# Patient Record
Sex: FEMALE | Race: WHITE
[De-identification: names, ages, dates, MRNs, and addresses within clinical notes are randomized per-mention and may not be internally consistent; named-entity substitution may affect disease eponyms.]

---

## 2021-09-05 ENCOUNTER — HOSPITAL ENCOUNTER (EMERGENCY)
Dept: HOSPITAL 79 - ER1 | Age: 36
Discharge: HOME | End: 2021-09-05
Payer: COMMERCIAL

## 2021-09-05 VITALS — WEIGHT: 275 LBS | HEIGHT: 72 IN | BODY MASS INDEX: 37.25 KG/M2

## 2021-09-05 DIAGNOSIS — I10: ICD-10-CM

## 2021-09-05 DIAGNOSIS — J12.82: ICD-10-CM

## 2021-09-05 DIAGNOSIS — U07.1: ICD-10-CM

## 2021-09-05 DIAGNOSIS — J45.909: ICD-10-CM

## 2021-09-05 DIAGNOSIS — Z23: Primary | ICD-10-CM

## 2021-09-05 DIAGNOSIS — Z88.1: ICD-10-CM

## 2021-09-05 DIAGNOSIS — F17.200: ICD-10-CM

## 2021-09-05 LAB
BUN/CREATININE RATIO: 20 (ref 0–10)
HGB BLD-MCNC: 10.6 GM/DL (ref 12.3–15.3)
RED BLOOD COUNT: 5.56 M/UL (ref 4–5.1)
WHITE BLOOD COUNT: 16.3 K/UL (ref 4.5–11)

## 2021-09-05 PROCEDURE — M0243 CASIRIVI AND IMDEVI INFUSION: HCPCS

## 2021-11-19 ENCOUNTER — HOSPITAL ENCOUNTER (INPATIENT)
Facility: HOSPITAL | Age: 36
LOS: 2 days | Discharge: HOME | End: 2021-11-22
Attending: EMERGENCY MEDICINE | Admitting: INTERNAL MEDICINE

## 2021-11-19 ENCOUNTER — APPOINTMENT (OUTPATIENT)
Dept: GENERAL RADIOLOGY | Facility: HOSPITAL | Age: 36
End: 2021-11-19

## 2021-11-19 DIAGNOSIS — J18.9 PNEUMONIA OF BOTH LUNGS DUE TO INFECTIOUS ORGANISM, UNSPECIFIED PART OF LUNG: ICD-10-CM

## 2021-11-19 DIAGNOSIS — A41.9 SEPSIS, DUE TO UNSPECIFIED ORGANISM, UNSPECIFIED WHETHER ACUTE ORGAN DYSFUNCTION PRESENT (HCC): ICD-10-CM

## 2021-11-19 DIAGNOSIS — J96.01 ACUTE RESPIRATORY FAILURE WITH HYPOXIA (HCC): Primary | ICD-10-CM

## 2021-11-19 LAB
A-A DO2: 54.1 MMHG (ref 0–300)
ALBUMIN SERPL-MCNC: 4.34 G/DL (ref 3.5–5.2)
ALBUMIN/GLOB SERPL: 1.3 G/DL
ALP SERPL-CCNC: 80 U/L (ref 39–117)
ALT SERPL W P-5'-P-CCNC: 17 U/L (ref 1–33)
ANION GAP SERPL CALCULATED.3IONS-SCNC: 14.2 MMOL/L (ref 5–15)
ARTERIAL PATENCY WRIST A: POSITIVE
AST SERPL-CCNC: 30 U/L (ref 1–32)
ATMOSPHERIC PRESS: 735 MMHG
B PARAPERT DNA SPEC QL NAA+PROBE: NOT DETECTED
B PERT DNA SPEC QL NAA+PROBE: NOT DETECTED
B-HCG UR QL: NEGATIVE
BASE EXCESS BLDA CALC-SCNC: 2.6 MMOL/L (ref 0–2)
BDY SITE: ABNORMAL
BILIRUB SERPL-MCNC: 0.3 MG/DL (ref 0–1.2)
BILIRUB UR QL STRIP: NEGATIVE
BODY TEMPERATURE: 0 C
BUN SERPL-MCNC: 15 MG/DL (ref 6–20)
BUN/CREAT SERPL: 23.4 (ref 7–25)
C PNEUM DNA NPH QL NAA+NON-PROBE: NOT DETECTED
CALCIUM SPEC-SCNC: 9.5 MG/DL (ref 8.6–10.5)
CHLORIDE SERPL-SCNC: 102 MMOL/L (ref 98–107)
CLARITY UR: CLEAR
CO2 BLDA-SCNC: 26.6 MMOL/L (ref 22–33)
CO2 SERPL-SCNC: 25.8 MMOL/L (ref 22–29)
COHGB MFR BLD: 1.3 % (ref 0–5)
COLOR UR: YELLOW
CREAT SERPL-MCNC: 0.64 MG/DL (ref 0.57–1)
CRP SERPL-MCNC: 11.8 MG/DL (ref 0–0.5)
D DIMER PPP FEU-MCNC: 1 MCGFEU/ML (ref 0–0.5)
D-LACTATE SERPL-SCNC: 2.4 MMOL/L (ref 0.5–2)
DEPRECATED RDW RBC AUTO: 48.5 FL (ref 37–54)
EOSINOPHIL # BLD MANUAL: 0.33 10*3/MM3 (ref 0–0.4)
EOSINOPHIL NFR BLD MANUAL: 1 % (ref 0.3–6.2)
ERYTHROCYTE [DISTWIDTH] IN BLOOD BY AUTOMATED COUNT: 20 % (ref 12.3–15.4)
FERRITIN SERPL-MCNC: 255.1 NG/ML (ref 13–150)
FLUAV SUBTYP SPEC NAA+PROBE: NOT DETECTED
FLUBV RNA ISLT QL NAA+PROBE: NOT DETECTED
GFR SERPL CREATININE-BSD FRML MDRD: 106 ML/MIN/1.73
GLOBULIN UR ELPH-MCNC: 3.5 GM/DL
GLUCOSE SERPL-MCNC: 98 MG/DL (ref 65–99)
GLUCOSE UR STRIP-MCNC: NEGATIVE MG/DL
HADV DNA SPEC NAA+PROBE: NOT DETECTED
HCO3 BLDA-SCNC: 25.6 MMOL/L (ref 20–26)
HCOV 229E RNA SPEC QL NAA+PROBE: NOT DETECTED
HCOV HKU1 RNA SPEC QL NAA+PROBE: NOT DETECTED
HCOV NL63 RNA SPEC QL NAA+PROBE: NOT DETECTED
HCOV OC43 RNA SPEC QL NAA+PROBE: NOT DETECTED
HCT VFR BLD AUTO: 38.5 % (ref 34–46.6)
HCT VFR BLD CALC: 35.4 % (ref 38–51)
HGB BLD-MCNC: 11.5 G/DL (ref 12–15.9)
HGB BLDA-MCNC: 11.5 G/DL (ref 13.5–17.5)
HGB UR QL STRIP.AUTO: NEGATIVE
HMPV RNA NPH QL NAA+NON-PROBE: NOT DETECTED
HOLD SPECIMEN: NORMAL
HPIV1 RNA ISLT QL NAA+PROBE: NOT DETECTED
HPIV2 RNA SPEC QL NAA+PROBE: NOT DETECTED
HPIV3 RNA NPH QL NAA+PROBE: NOT DETECTED
HPIV4 P GENE NPH QL NAA+PROBE: NOT DETECTED
HYPOCHROMIA BLD QL: ABNORMAL
INHALED O2 CONCENTRATION: 21 %
KETONES UR QL STRIP: NEGATIVE
LDH SERPL-CCNC: 627 U/L (ref 135–214)
LEUKOCYTE ESTERASE UR QL STRIP.AUTO: NEGATIVE
LYMPHOCYTES # BLD MANUAL: 4.93 10*3/MM3 (ref 0.7–3.1)
LYMPHOCYTES NFR BLD MANUAL: 3 % (ref 5–12)
Lab: ABNORMAL
Lab: ABNORMAL
M PNEUMO IGG SER IA-ACNC: NOT DETECTED
MCH RBC QN AUTO: 20.8 PG (ref 26.6–33)
MCHC RBC AUTO-ENTMCNC: 29.9 G/DL (ref 31.5–35.7)
MCV RBC AUTO: 69.6 FL (ref 79–97)
METHGB BLD QL: 0 % (ref 0–3)
MICROCYTES BLD QL: ABNORMAL
MODALITY: ABNORMAL
MONOCYTES # BLD: 0.99 10*3/MM3 (ref 0.1–0.9)
NEUTROPHILS # BLD AUTO: 26.64 10*3/MM3 (ref 1.7–7)
NEUTROPHILS NFR BLD MANUAL: 76 % (ref 42.7–76)
NEUTS BAND NFR BLD MANUAL: 5 % (ref 0–5)
NITRITE UR QL STRIP: NEGATIVE
NOTE: ABNORMAL
NOTIFIED BY: ABNORMAL
NOTIFIED WHO: ABNORMAL
OXYHGB MFR BLDV: 87.8 % (ref 94–99)
PCO2 BLDA: 32.9 MM HG (ref 35–45)
PCO2 TEMP ADJ BLD: ABNORMAL MM[HG]
PH BLDA: 7.5 PH UNITS (ref 7.35–7.45)
PH UR STRIP.AUTO: 7 [PH] (ref 5–8)
PH, TEMP CORRECTED: ABNORMAL
PLATELET # BLD AUTO: 497 10*3/MM3 (ref 140–450)
PMV BLD AUTO: 10.1 FL (ref 6–12)
PO2 BLDA: 53.2 MM HG (ref 83–108)
PO2 TEMP ADJ BLD: ABNORMAL MM[HG]
POTASSIUM SERPL-SCNC: 3.9 MMOL/L (ref 3.5–5.2)
PROT SERPL-MCNC: 7.8 G/DL (ref 6–8.5)
PROT UR QL STRIP: ABNORMAL
RBC # BLD AUTO: 5.53 10*6/MM3 (ref 3.77–5.28)
RHINOVIRUS RNA SPEC NAA+PROBE: NOT DETECTED
RSV RNA NPH QL NAA+NON-PROBE: NOT DETECTED
SAO2 % BLDCOA: 89 % (ref 94–99)
SARS-COV-2 RNA NPH QL NAA+NON-PROBE: NOT DETECTED
SCAN SLIDE: NORMAL
SMALL PLATELETS BLD QL SMEAR: ABNORMAL
SODIUM SERPL-SCNC: 142 MMOL/L (ref 136–145)
SP GR UR STRIP: 1.03 (ref 1–1.03)
TROPONIN T SERPL-MCNC: <0.01 NG/ML (ref 0–0.03)
UROBILINOGEN UR QL STRIP: ABNORMAL
VARIANT LYMPHS NFR BLD MANUAL: 15 % (ref 19.6–45.3)
VENTILATOR MODE: ABNORMAL
WBC NRBC COR # BLD: 32.89 10*3/MM3 (ref 3.4–10.8)
WHOLE BLOOD HOLD SPECIMEN: NORMAL
WHOLE BLOOD HOLD SPECIMEN: NORMAL

## 2021-11-19 PROCEDURE — 94799 UNLISTED PULMONARY SVC/PX: CPT

## 2021-11-19 PROCEDURE — 85379 FIBRIN DEGRADATION QUANT: CPT | Performed by: NURSE PRACTITIONER

## 2021-11-19 PROCEDURE — 87040 BLOOD CULTURE FOR BACTERIA: CPT | Performed by: NURSE PRACTITIONER

## 2021-11-19 PROCEDURE — 84484 ASSAY OF TROPONIN QUANT: CPT | Performed by: NURSE PRACTITIONER

## 2021-11-19 PROCEDURE — 25010000002 CEFTRIAXONE PER 250 MG: Performed by: NURSE PRACTITIONER

## 2021-11-19 PROCEDURE — 82805 BLOOD GASES W/O2 SATURATION: CPT

## 2021-11-19 PROCEDURE — 83050 HGB METHEMOGLOBIN QUAN: CPT

## 2021-11-19 PROCEDURE — 0202U NFCT DS 22 TRGT SARS-COV-2: CPT | Performed by: NURSE PRACTITIONER

## 2021-11-19 PROCEDURE — 82375 ASSAY CARBOXYHB QUANT: CPT

## 2021-11-19 PROCEDURE — 82728 ASSAY OF FERRITIN: CPT | Performed by: NURSE PRACTITIONER

## 2021-11-19 PROCEDURE — 80306 DRUG TEST PRSMV INSTRMNT: CPT | Performed by: INTERNAL MEDICINE

## 2021-11-19 PROCEDURE — 80053 COMPREHEN METABOLIC PANEL: CPT | Performed by: NURSE PRACTITIONER

## 2021-11-19 PROCEDURE — 93005 ELECTROCARDIOGRAM TRACING: CPT | Performed by: NURSE PRACTITIONER

## 2021-11-19 PROCEDURE — 84145 PROCALCITONIN (PCT): CPT | Performed by: NURSE PRACTITIONER

## 2021-11-19 PROCEDURE — 86140 C-REACTIVE PROTEIN: CPT | Performed by: NURSE PRACTITIONER

## 2021-11-19 PROCEDURE — 81025 URINE PREGNANCY TEST: CPT | Performed by: NURSE PRACTITIONER

## 2021-11-19 PROCEDURE — 81003 URINALYSIS AUTO W/O SCOPE: CPT | Performed by: NURSE PRACTITIONER

## 2021-11-19 PROCEDURE — 71045 X-RAY EXAM CHEST 1 VIEW: CPT

## 2021-11-19 PROCEDURE — 87899 AGENT NOS ASSAY W/OPTIC: CPT | Performed by: PHYSICIAN ASSISTANT

## 2021-11-19 PROCEDURE — 83605 ASSAY OF LACTIC ACID: CPT | Performed by: NURSE PRACTITIONER

## 2021-11-19 PROCEDURE — 36600 WITHDRAWAL OF ARTERIAL BLOOD: CPT

## 2021-11-19 PROCEDURE — 85007 BL SMEAR W/DIFF WBC COUNT: CPT | Performed by: NURSE PRACTITIONER

## 2021-11-19 PROCEDURE — 99285 EMERGENCY DEPT VISIT HI MDM: CPT

## 2021-11-19 PROCEDURE — 83615 LACTATE (LD) (LDH) ENZYME: CPT | Performed by: NURSE PRACTITIONER

## 2021-11-19 PROCEDURE — 25010000002 METHYLPREDNISOLONE PER 125 MG: Performed by: NURSE PRACTITIONER

## 2021-11-19 PROCEDURE — 85025 COMPLETE CBC W/AUTO DIFF WBC: CPT | Performed by: NURSE PRACTITIONER

## 2021-11-19 RX ORDER — SODIUM CHLORIDE 0.9 % (FLUSH) 0.9 %
10 SYRINGE (ML) INJECTION AS NEEDED
Status: DISCONTINUED | OUTPATIENT
Start: 2021-11-19 | End: 2021-11-22 | Stop reason: HOSPADM

## 2021-11-19 RX ORDER — METHYLPREDNISOLONE SODIUM SUCCINATE 125 MG/2ML
125 INJECTION, POWDER, LYOPHILIZED, FOR SOLUTION INTRAMUSCULAR; INTRAVENOUS ONCE
Status: COMPLETED | OUTPATIENT
Start: 2021-11-19 | End: 2021-11-19

## 2021-11-19 RX ADMIN — SODIUM CHLORIDE 1000 ML: 9 INJECTION, SOLUTION INTRAVENOUS at 22:46

## 2021-11-19 RX ADMIN — METHYLPREDNISOLONE SODIUM SUCCINATE 125 MG: 125 INJECTION, POWDER, FOR SOLUTION INTRAMUSCULAR; INTRAVENOUS at 23:34

## 2021-11-19 RX ADMIN — CEFTRIAXONE SODIUM 2 G: 2 INJECTION, POWDER, FOR SOLUTION INTRAMUSCULAR; INTRAVENOUS at 23:34

## 2021-11-20 ENCOUNTER — APPOINTMENT (OUTPATIENT)
Dept: ULTRASOUND IMAGING | Facility: HOSPITAL | Age: 36
End: 2021-11-20

## 2021-11-20 ENCOUNTER — APPOINTMENT (OUTPATIENT)
Dept: CARDIOLOGY | Facility: HOSPITAL | Age: 36
End: 2021-11-20

## 2021-11-20 ENCOUNTER — APPOINTMENT (OUTPATIENT)
Dept: CT IMAGING | Facility: HOSPITAL | Age: 36
End: 2021-11-20

## 2021-11-20 PROBLEM — A41.9 SEPSIS DUE TO PNEUMONIA: Status: ACTIVE | Noted: 2021-11-20

## 2021-11-20 PROBLEM — J18.9 SEPSIS DUE TO PNEUMONIA: Status: ACTIVE | Noted: 2021-11-20

## 2021-11-20 LAB
ALBUMIN SERPL-MCNC: 3.81 G/DL (ref 3.5–5.2)
ALBUMIN/GLOB SERPL: 1.2 G/DL
ALP SERPL-CCNC: 80 U/L (ref 39–117)
ALT SERPL W P-5'-P-CCNC: 17 U/L (ref 1–33)
AMPHET+METHAMPHET UR QL: NEGATIVE
AMPHETAMINES UR QL: NEGATIVE
ANION GAP SERPL CALCULATED.3IONS-SCNC: 11.6 MMOL/L (ref 5–15)
ANISOCYTOSIS BLD QL: NORMAL
AST SERPL-CCNC: 31 U/L (ref 1–32)
BARBITURATES UR QL SCN: NEGATIVE
BASOPHILS # BLD AUTO: 0.03 10*3/MM3 (ref 0–0.2)
BASOPHILS NFR BLD AUTO: 0.1 % (ref 0–1.5)
BENZODIAZ UR QL SCN: NEGATIVE
BH CV ECHO MEAS - AO MAX PG: 8.1 MMHG
BH CV ECHO MEAS - AO MEAN PG: 5 MMHG
BH CV ECHO MEAS - AO ROOT AREA (BSA CORRECTED): 1.2
BH CV ECHO MEAS - AO ROOT AREA: 6.2 CM^2
BH CV ECHO MEAS - AO ROOT DIAM: 2.8 CM
BH CV ECHO MEAS - AO V2 MAX: 142 CM/SEC
BH CV ECHO MEAS - AO V2 MEAN: 99.9 CM/SEC
BH CV ECHO MEAS - AO V2 VTI: 30.5 CM
BH CV ECHO MEAS - BSA(HAYCOCK): 2.6 M^2
BH CV ECHO MEAS - BSA: 2.4 M^2
BH CV ECHO MEAS - BZI_BMI: 39.9 KILOGRAMS/M^2
BH CV ECHO MEAS - BZI_METRIC_HEIGHT: 177.8 CM
BH CV ECHO MEAS - BZI_METRIC_WEIGHT: 126.1 KG
BH CV ECHO MEAS - EDV(CUBED): 110.6 ML
BH CV ECHO MEAS - EDV(MOD-SP4): 49.8 ML
BH CV ECHO MEAS - EDV(TEICH): 107.5 ML
BH CV ECHO MEAS - EF(CUBED): 61.2 %
BH CV ECHO MEAS - EF(MOD-SP4): 55.6 %
BH CV ECHO MEAS - EF(TEICH): 52.7 %
BH CV ECHO MEAS - ESV(CUBED): 42.9 ML
BH CV ECHO MEAS - ESV(MOD-SP4): 22.1 ML
BH CV ECHO MEAS - ESV(TEICH): 50.9 ML
BH CV ECHO MEAS - FS: 27.1 %
BH CV ECHO MEAS - IVS/LVPW: 0.67
BH CV ECHO MEAS - IVSD: 1 CM
BH CV ECHO MEAS - LV DIASTOLIC VOL/BSA (35-75): 20.7 ML/M^2
BH CV ECHO MEAS - LV MASS(C)D: 232.3 GRAMS
BH CV ECHO MEAS - LV MASS(C)DI: 96.7 GRAMS/M^2
BH CV ECHO MEAS - LV SYSTOLIC VOL/BSA (12-30): 9.2 ML/M^2
BH CV ECHO MEAS - LVIDD: 4.8 CM
BH CV ECHO MEAS - LVIDS: 3.5 CM
BH CV ECHO MEAS - LVLD AP4: 6.8 CM
BH CV ECHO MEAS - LVLS AP4: 5 CM
BH CV ECHO MEAS - LVPWD: 1.5 CM
BH CV ECHO MEAS - MV A MAX VEL: 84.5 CM/SEC
BH CV ECHO MEAS - MV E MAX VEL: 116 CM/SEC
BH CV ECHO MEAS - MV E/A: 1.4
BH CV ECHO MEAS - PA ACC TIME: 0.09 SEC
BH CV ECHO MEAS - PA PR(ACCEL): 37.6 MMHG
BH CV ECHO MEAS - SI(AO): 78.2 ML/M^2
BH CV ECHO MEAS - SI(CUBED): 28.2 ML/M^2
BH CV ECHO MEAS - SI(MOD-SP4): 11.5 ML/M^2
BH CV ECHO MEAS - SI(TEICH): 23.6 ML/M^2
BH CV ECHO MEAS - SV(AO): 187.8 ML
BH CV ECHO MEAS - SV(CUBED): 67.7 ML
BH CV ECHO MEAS - SV(MOD-SP4): 27.7 ML
BH CV ECHO MEAS - SV(TEICH): 56.7 ML
BILIRUB SERPL-MCNC: 0.3 MG/DL (ref 0–1.2)
BUN SERPL-MCNC: 15 MG/DL (ref 6–20)
BUN/CREAT SERPL: 24.6 (ref 7–25)
BUPRENORPHINE SERPL-MCNC: NEGATIVE NG/ML
CALCIUM SPEC-SCNC: 8.8 MG/DL (ref 8.6–10.5)
CANNABINOIDS SERPL QL: NEGATIVE
CHLORIDE SERPL-SCNC: 104 MMOL/L (ref 98–107)
CO2 SERPL-SCNC: 23.4 MMOL/L (ref 22–29)
COCAINE UR QL: NEGATIVE
CREAT SERPL-MCNC: 0.61 MG/DL (ref 0.57–1)
CRP SERPL-MCNC: 10.78 MG/DL (ref 0–0.5)
D-LACTATE SERPL-SCNC: 1.3 MMOL/L (ref 0.5–2)
D-LACTATE SERPL-SCNC: 1.5 MMOL/L (ref 0.5–2)
D-LACTATE SERPL-SCNC: 1.7 MMOL/L (ref 0.5–2)
D-LACTATE SERPL-SCNC: 1.9 MMOL/L (ref 0.5–2)
DEPRECATED RDW RBC AUTO: 47.8 FL (ref 37–54)
EOSINOPHIL # BLD AUTO: 0 10*3/MM3 (ref 0–0.4)
EOSINOPHIL NFR BLD AUTO: 0 % (ref 0.3–6.2)
ERYTHROCYTE [DISTWIDTH] IN BLOOD BY AUTOMATED COUNT: 19.6 % (ref 12.3–15.4)
FOLATE SERPL-MCNC: 4.31 NG/ML (ref 4.78–24.2)
GFR SERPL CREATININE-BSD FRML MDRD: 112 ML/MIN/1.73
GLOBULIN UR ELPH-MCNC: 3.1 GM/DL
GLUCOSE SERPL-MCNC: 113 MG/DL (ref 65–99)
HBA1C MFR BLD: 5.3 % (ref 4.8–5.6)
HCT VFR BLD AUTO: 34.9 % (ref 34–46.6)
HGB BLD-MCNC: 10.5 G/DL (ref 12–15.9)
HYPOCHROMIA BLD QL: NORMAL
IMM GRANULOCYTES # BLD AUTO: 0.29 10*3/MM3 (ref 0–0.05)
IMM GRANULOCYTES NFR BLD AUTO: 1.1 % (ref 0–0.5)
IRON 24H UR-MRATE: 13 MCG/DL (ref 37–145)
IRON SATN MFR SERPL: 4 % (ref 20–50)
L PNEUMO1 AG UR QL IA: NEGATIVE
LYMPHOCYTES # BLD AUTO: 1.59 10*3/MM3 (ref 0.7–3.1)
LYMPHOCYTES NFR BLD AUTO: 6.2 % (ref 19.6–45.3)
MAXIMAL PREDICTED HEART RATE: 185 BPM
MCH RBC QN AUTO: 20.7 PG (ref 26.6–33)
MCHC RBC AUTO-ENTMCNC: 30.1 G/DL (ref 31.5–35.7)
MCV RBC AUTO: 68.8 FL (ref 79–97)
METHADONE UR QL SCN: POSITIVE
MICROCYTES BLD QL: NORMAL
MONOCYTES # BLD AUTO: 0.61 10*3/MM3 (ref 0.1–0.9)
MONOCYTES NFR BLD AUTO: 2.4 % (ref 5–12)
MRSA DNA SPEC QL NAA+PROBE: NEGATIVE
NEUTROPHILS NFR BLD AUTO: 23.2 10*3/MM3 (ref 1.7–7)
NEUTROPHILS NFR BLD AUTO: 90.2 % (ref 42.7–76)
NRBC BLD AUTO-RTO: 0 /100 WBC (ref 0–0.2)
OPIATES UR QL: NEGATIVE
OXYCODONE UR QL SCN: NEGATIVE
PCP UR QL SCN: NEGATIVE
PLAT MORPH BLD: NORMAL
PLATELET # BLD AUTO: 431 10*3/MM3 (ref 140–450)
PMV BLD AUTO: 10.2 FL (ref 6–12)
POTASSIUM SERPL-SCNC: 4.4 MMOL/L (ref 3.5–5.2)
PROCALCITONIN SERPL-MCNC: 0.04 NG/ML (ref 0–0.25)
PROPOXYPH UR QL: NEGATIVE
PROT SERPL-MCNC: 6.9 G/DL (ref 6–8.5)
RBC # BLD AUTO: 5.07 10*6/MM3 (ref 3.77–5.28)
S AUREUS DNA SPEC QL NAA+PROBE: NEGATIVE
S PNEUM AG SPEC QL LA: NEGATIVE
SODIUM SERPL-SCNC: 139 MMOL/L (ref 136–145)
STOMATOCYTES BLD QL SMEAR: NORMAL
STRESS TARGET HR: 157 BPM
T4 FREE SERPL-MCNC: 1.2 NG/DL (ref 0.93–1.7)
TIBC SERPL-MCNC: 350 MCG/DL (ref 298–536)
TRANSFERRIN SERPL-MCNC: 235 MG/DL (ref 200–360)
TRICYCLICS UR QL SCN: NEGATIVE
TSH SERPL DL<=0.05 MIU/L-ACNC: 0.78 UIU/ML (ref 0.27–4.2)
VIT B12 BLD-MCNC: 499 PG/ML (ref 211–946)
WBC NRBC COR # BLD: 25.72 10*3/MM3 (ref 3.4–10.8)

## 2021-11-20 PROCEDURE — 93970 EXTREMITY STUDY: CPT | Performed by: RADIOLOGY

## 2021-11-20 PROCEDURE — 94799 UNLISTED PULMONARY SVC/PX: CPT

## 2021-11-20 PROCEDURE — 83036 HEMOGLOBIN GLYCOSYLATED A1C: CPT | Performed by: PHYSICIAN ASSISTANT

## 2021-11-20 PROCEDURE — 80050 GENERAL HEALTH PANEL: CPT | Performed by: INTERNAL MEDICINE

## 2021-11-20 PROCEDURE — 84439 ASSAY OF FREE THYROXINE: CPT | Performed by: PHYSICIAN ASSISTANT

## 2021-11-20 PROCEDURE — 87641 MR-STAPH DNA AMP PROBE: CPT | Performed by: INTERNAL MEDICINE

## 2021-11-20 PROCEDURE — 93306 TTE W/DOPPLER COMPLETE: CPT

## 2021-11-20 PROCEDURE — 93970 EXTREMITY STUDY: CPT

## 2021-11-20 PROCEDURE — 87070 CULTURE OTHR SPECIMN AEROBIC: CPT | Performed by: INTERNAL MEDICINE

## 2021-11-20 PROCEDURE — 85060 BLOOD SMEAR INTERPRETATION: CPT | Performed by: INTERNAL MEDICINE

## 2021-11-20 PROCEDURE — 25010000002 AZITHROMYCIN PER 500 MG: Performed by: INTERNAL MEDICINE

## 2021-11-20 PROCEDURE — 25010000002 METHYLPREDNISOLONE PER 40 MG: Performed by: PHYSICIAN ASSISTANT

## 2021-11-20 PROCEDURE — 86140 C-REACTIVE PROTEIN: CPT | Performed by: PHYSICIAN ASSISTANT

## 2021-11-20 PROCEDURE — 82607 VITAMIN B-12: CPT | Performed by: PHYSICIAN ASSISTANT

## 2021-11-20 PROCEDURE — 87205 SMEAR GRAM STAIN: CPT | Performed by: INTERNAL MEDICINE

## 2021-11-20 PROCEDURE — 71275 CT ANGIOGRAPHY CHEST: CPT

## 2021-11-20 PROCEDURE — 82746 ASSAY OF FOLIC ACID SERUM: CPT | Performed by: PHYSICIAN ASSISTANT

## 2021-11-20 PROCEDURE — 83605 ASSAY OF LACTIC ACID: CPT | Performed by: NURSE PRACTITIONER

## 2021-11-20 PROCEDURE — 83605 ASSAY OF LACTIC ACID: CPT | Performed by: INTERNAL MEDICINE

## 2021-11-20 PROCEDURE — 87640 STAPH A DNA AMP PROBE: CPT | Performed by: INTERNAL MEDICINE

## 2021-11-20 PROCEDURE — 83540 ASSAY OF IRON: CPT | Performed by: PHYSICIAN ASSISTANT

## 2021-11-20 PROCEDURE — 25010000002 ENOXAPARIN PER 10 MG: Performed by: INTERNAL MEDICINE

## 2021-11-20 PROCEDURE — 84466 ASSAY OF TRANSFERRIN: CPT | Performed by: PHYSICIAN ASSISTANT

## 2021-11-20 PROCEDURE — 94640 AIRWAY INHALATION TREATMENT: CPT

## 2021-11-20 PROCEDURE — 25010000002 IOPAMIDOL 61 % SOLUTION: Performed by: EMERGENCY MEDICINE

## 2021-11-20 PROCEDURE — 85007 BL SMEAR W/DIFF WBC COUNT: CPT | Performed by: INTERNAL MEDICINE

## 2021-11-20 PROCEDURE — 87899 AGENT NOS ASSAY W/OPTIC: CPT | Performed by: INTERNAL MEDICINE

## 2021-11-20 PROCEDURE — 99223 1ST HOSP IP/OBS HIGH 75: CPT | Performed by: INTERNAL MEDICINE

## 2021-11-20 RX ORDER — CHOLECALCIFEROL (VITAMIN D3) 125 MCG
10 CAPSULE ORAL NIGHTLY PRN
Status: DISCONTINUED | OUTPATIENT
Start: 2021-11-20 | End: 2021-11-22 | Stop reason: HOSPADM

## 2021-11-20 RX ORDER — MELOXICAM 7.5 MG/1
15 TABLET ORAL DAILY
Status: DISCONTINUED | OUTPATIENT
Start: 2021-11-20 | End: 2021-11-22 | Stop reason: HOSPADM

## 2021-11-20 RX ORDER — METHYLPREDNISOLONE SODIUM SUCCINATE 40 MG/ML
40 INJECTION, POWDER, LYOPHILIZED, FOR SOLUTION INTRAMUSCULAR; INTRAVENOUS EVERY 12 HOURS
Status: DISCONTINUED | OUTPATIENT
Start: 2021-11-20 | End: 2021-11-22 | Stop reason: HOSPADM

## 2021-11-20 RX ORDER — PREDNISONE 1 MG/1
5 TABLET ORAL TAKE AS DIRECTED
Status: CANCELLED | OUTPATIENT
Start: 2021-11-20

## 2021-11-20 RX ORDER — ALBUTEROL SULFATE 2.5 MG/3ML
2.5 SOLUTION RESPIRATORY (INHALATION) EVERY 6 HOURS PRN
Status: CANCELLED | OUTPATIENT
Start: 2021-11-20

## 2021-11-20 RX ORDER — LEVOFLOXACIN 500 MG/1
500 TABLET, FILM COATED ORAL DAILY
Status: CANCELLED | OUTPATIENT
Start: 2021-11-20 | End: 2021-11-29

## 2021-11-20 RX ORDER — GUAIFENESIN/DEXTROMETHORPHAN 100-10MG/5
5 SYRUP ORAL EVERY 4 HOURS PRN
Status: DISCONTINUED | OUTPATIENT
Start: 2021-11-20 | End: 2021-11-22 | Stop reason: HOSPADM

## 2021-11-20 RX ORDER — IRON POLYSACCHARIDE COMPLEX 150 MG
150 CAPSULE ORAL DAILY
Status: DISCONTINUED | OUTPATIENT
Start: 2021-11-20 | End: 2021-11-22 | Stop reason: HOSPADM

## 2021-11-20 RX ORDER — IPRATROPIUM BROMIDE AND ALBUTEROL SULFATE 2.5; .5 MG/3ML; MG/3ML
3 SOLUTION RESPIRATORY (INHALATION)
Status: DISCONTINUED | OUTPATIENT
Start: 2021-11-20 | End: 2021-11-22 | Stop reason: HOSPADM

## 2021-11-20 RX ORDER — HYDROCHLOROTHIAZIDE 25 MG/1
25 TABLET ORAL DAILY
Status: DISCONTINUED | OUTPATIENT
Start: 2021-11-20 | End: 2021-11-22 | Stop reason: HOSPADM

## 2021-11-20 RX ORDER — BENZONATATE 100 MG/1
100 CAPSULE ORAL 3 TIMES DAILY PRN
Status: DISCONTINUED | OUTPATIENT
Start: 2021-11-20 | End: 2021-11-22 | Stop reason: HOSPADM

## 2021-11-20 RX ORDER — LEVOFLOXACIN 500 MG/1
500 TABLET, FILM COATED ORAL DAILY
COMMUNITY
End: 2021-11-22 | Stop reason: HOSPADM

## 2021-11-20 RX ORDER — SODIUM CHLORIDE 0.9 % (FLUSH) 0.9 %
10 SYRINGE (ML) INJECTION AS NEEDED
Status: DISCONTINUED | OUTPATIENT
Start: 2021-11-20 | End: 2021-11-22 | Stop reason: HOSPADM

## 2021-11-20 RX ORDER — L.ACID,PARA/B.BIFIDUM/S.THERM 8B CELL
1 CAPSULE ORAL DAILY
Status: DISCONTINUED | OUTPATIENT
Start: 2021-11-20 | End: 2021-11-22 | Stop reason: HOSPADM

## 2021-11-20 RX ORDER — ALBUTEROL SULFATE 1.25 MG/3ML
1 SOLUTION RESPIRATORY (INHALATION) EVERY 6 HOURS PRN
Status: CANCELLED | OUTPATIENT
Start: 2021-11-20

## 2021-11-20 RX ORDER — LIDOCAINE 50 MG/G
2 PATCH TOPICAL
Status: DISCONTINUED | OUTPATIENT
Start: 2021-11-20 | End: 2021-11-22 | Stop reason: HOSPADM

## 2021-11-20 RX ORDER — HYDROCHLOROTHIAZIDE 25 MG/1
25 TABLET ORAL DAILY
Status: CANCELLED | OUTPATIENT
Start: 2021-11-20

## 2021-11-20 RX ORDER — MELOXICAM 15 MG/1
15 TABLET ORAL DAILY
COMMUNITY
End: 2022-01-15

## 2021-11-20 RX ORDER — METHADONE HYDROCHLORIDE 10 MG/1
65 TABLET ORAL DAILY
COMMUNITY

## 2021-11-20 RX ORDER — ALBUTEROL SULFATE 90 UG/1
2 AEROSOL, METERED RESPIRATORY (INHALATION) EVERY 6 HOURS PRN
COMMUNITY

## 2021-11-20 RX ORDER — PREDNISONE 5 MG/1
1 TABLET ORAL TAKE AS DIRECTED
COMMUNITY
End: 2021-11-22 | Stop reason: HOSPADM

## 2021-11-20 RX ORDER — SODIUM CHLORIDE 9 MG/ML
100 INJECTION, SOLUTION INTRAVENOUS CONTINUOUS
Status: DISCONTINUED | OUTPATIENT
Start: 2021-11-20 | End: 2021-11-20

## 2021-11-20 RX ORDER — MELOXICAM 7.5 MG/1
15 TABLET ORAL DAILY
Status: CANCELLED | OUTPATIENT
Start: 2021-11-20

## 2021-11-20 RX ORDER — NITROGLYCERIN 0.4 MG/1
0.4 TABLET SUBLINGUAL
Status: DISCONTINUED | OUTPATIENT
Start: 2021-11-20 | End: 2021-11-22 | Stop reason: HOSPADM

## 2021-11-20 RX ORDER — SODIUM CHLORIDE 0.9 % (FLUSH) 0.9 %
10 SYRINGE (ML) INJECTION EVERY 12 HOURS SCHEDULED
Status: DISCONTINUED | OUTPATIENT
Start: 2021-11-20 | End: 2021-11-22 | Stop reason: HOSPADM

## 2021-11-20 RX ORDER — HYDROCHLOROTHIAZIDE 25 MG/1
25 TABLET ORAL DAILY
COMMUNITY
End: 2022-01-15

## 2021-11-20 RX ORDER — ONDANSETRON 4 MG/1
4 TABLET, ORALLY DISINTEGRATING ORAL EVERY 6 HOURS PRN
Status: DISCONTINUED | OUTPATIENT
Start: 2021-11-20 | End: 2021-11-22 | Stop reason: HOSPADM

## 2021-11-20 RX ORDER — ALBUTEROL SULFATE 1.25 MG/3ML
1 SOLUTION RESPIRATORY (INHALATION) EVERY 6 HOURS PRN
COMMUNITY
End: 2022-01-19 | Stop reason: HOSPADM

## 2021-11-20 RX ORDER — PANTOPRAZOLE SODIUM 40 MG/1
40 TABLET, DELAYED RELEASE ORAL
Status: DISCONTINUED | OUTPATIENT
Start: 2021-11-20 | End: 2021-11-22 | Stop reason: HOSPADM

## 2021-11-20 RX ORDER — NICOTINE 21 MG/24HR
1 PATCH, TRANSDERMAL 24 HOURS TRANSDERMAL EVERY 24 HOURS
Status: DISCONTINUED | OUTPATIENT
Start: 2021-11-20 | End: 2021-11-22 | Stop reason: HOSPADM

## 2021-11-20 RX ORDER — HYDROXYZINE HYDROCHLORIDE 25 MG/1
25 TABLET, FILM COATED ORAL 3 TIMES DAILY PRN
Status: DISCONTINUED | OUTPATIENT
Start: 2021-11-20 | End: 2021-11-22 | Stop reason: HOSPADM

## 2021-11-20 RX ORDER — ALBUTEROL SULFATE 2.5 MG/3ML
2.5 SOLUTION RESPIRATORY (INHALATION) EVERY 6 HOURS PRN
Status: ON HOLD | COMMUNITY
End: 2021-11-20

## 2021-11-20 RX ORDER — ACETAMINOPHEN 325 MG/1
650 TABLET ORAL EVERY 6 HOURS PRN
Status: DISCONTINUED | OUTPATIENT
Start: 2021-11-20 | End: 2021-11-22 | Stop reason: HOSPADM

## 2021-11-20 RX ORDER — CHOLECALCIFEROL (VITAMIN D3) 125 MCG
10 CAPSULE ORAL NIGHTLY
Status: DISCONTINUED | OUTPATIENT
Start: 2021-11-20 | End: 2021-11-22 | Stop reason: HOSPADM

## 2021-11-20 RX ADMIN — METHYLPREDNISOLONE SODIUM SUCCINATE 40 MG: 40 INJECTION, POWDER, FOR SOLUTION INTRAMUSCULAR; INTRAVENOUS at 20:58

## 2021-11-20 RX ADMIN — IOPAMIDOL 75 ML: 612 INJECTION, SOLUTION INTRAVENOUS at 00:38

## 2021-11-20 RX ADMIN — ENOXAPARIN SODIUM 40 MG: 40 INJECTION SUBCUTANEOUS at 08:04

## 2021-11-20 RX ADMIN — SODIUM CHLORIDE, PRESERVATIVE FREE 10 ML: 5 INJECTION INTRAVENOUS at 20:57

## 2021-11-20 RX ADMIN — NICOTINE TRANSDERMAL SYSTEM 1 PATCH: 21 PATCH, EXTENDED RELEASE TRANSDERMAL at 08:04

## 2021-11-20 RX ADMIN — DOXYCYCLINE 100 MG: 100 INJECTION, POWDER, LYOPHILIZED, FOR SOLUTION INTRAVENOUS at 00:55

## 2021-11-20 RX ADMIN — MELOXICAM 15 MG: 7.5 TABLET ORAL at 17:01

## 2021-11-20 RX ADMIN — HYDROCHLOROTHIAZIDE 25 MG: 25 TABLET ORAL at 17:01

## 2021-11-20 RX ADMIN — IPRATROPIUM BROMIDE AND ALBUTEROL SULFATE 3 ML: .5; 3 SOLUTION RESPIRATORY (INHALATION) at 19:49

## 2021-11-20 RX ADMIN — SODIUM CHLORIDE 100 ML/HR: 9 INJECTION, SOLUTION INTRAVENOUS at 02:20

## 2021-11-20 RX ADMIN — IPRATROPIUM BROMIDE 0.5 MG: 0.5 SOLUTION RESPIRATORY (INHALATION) at 13:39

## 2021-11-20 RX ADMIN — Medication 1 CAPSULE: at 08:04

## 2021-11-20 RX ADMIN — METHADONE HYDROCHLORIDE 65 MG: 10 TABLET ORAL at 10:27

## 2021-11-20 RX ADMIN — PANTOPRAZOLE SODIUM 40 MG: 40 TABLET, DELAYED RELEASE ORAL at 05:14

## 2021-11-20 RX ADMIN — METHYLPREDNISOLONE SODIUM SUCCINATE 40 MG: 40 INJECTION, POWDER, FOR SOLUTION INTRAMUSCULAR; INTRAVENOUS at 08:04

## 2021-11-20 RX ADMIN — LIDOCAINE 2 PATCH: 50 PATCH CUTANEOUS at 08:04

## 2021-11-20 RX ADMIN — SODIUM CHLORIDE 100 ML/HR: 9 INJECTION, SOLUTION INTRAVENOUS at 13:31

## 2021-11-20 RX ADMIN — Medication 150 MG: at 10:27

## 2021-11-20 RX ADMIN — Medication 10 MG: at 20:57

## 2021-11-20 RX ADMIN — IPRATROPIUM BROMIDE 0.5 MG: 0.5 SOLUTION RESPIRATORY (INHALATION) at 07:16

## 2021-11-20 RX ADMIN — AZITHROMYCIN MONOHYDRATE 500 MG: 500 INJECTION, POWDER, LYOPHILIZED, FOR SOLUTION INTRAVENOUS at 04:01

## 2021-11-20 NOTE — PROGRESS NOTES
"    [facility] HOSPITALIST PROGRESS NOTE     Patient Identification:  Name:  Jessica A Collett  Age:  36 y.o.  Sex:  female  :  1985  MRN:  [id number]  Visit Number:  [id number]  ROOM: [address] [place] [address]     Primary Care Provider:  Lydia Bhagat APRN    Length of stay in inpatient status:  0    Subjective     Chief Compliant:    Chief Complaint   Patient presents with   • Shortness of Breath       History of Presenting Illness: Patient seen and evaluated in follow-up for severe sepsis secondary to community-acquired pneumonia with possible/likely exacerbation of underlying asthma with acute hypoxemic respiratory failure.  At time of examination this morning she remains on 2 L nasal cannula and is breathing comfortably.  Although she does note that she still feels short of breath when getting up out of bed and performing any exertion.  She is otherwise without complaint other than stating that she did not get any sleep last night.    Objective     Current Hospital Meds:[START ON 2021] cefTRIAXone, 2 g, Intravenous, Q24H   And  azithromycin, 500 mg, Intravenous, Q24H  enoxaparin, 40 mg, Subcutaneous, Q24H  ipratropium, 0.5 mg, Nebulization, 4x Daily - RT  iron polysaccharides, 150 mg, Oral, Daily  lactobacillus acidophilus, 1 capsule, Oral, Daily  lidocaine, 2 patch, Transdermal, Q24H  melatonin, 10 mg, Oral, Nightly  methadone, 65 mg, Oral, Daily  methylPREDNISolone sodium succinate, 40 mg, Intravenous, Q12H  nicotine, 1 patch, Transdermal, Q24H  pantoprazole, 40 mg, Oral, Q AM  sodium chloride, 10 mL, Intravenous, Q12H    sodium chloride, 100 mL/hr, Last Rate: 100 mL/hr (21 1331)        Current Antimicrobial Therapy:  Anti-Infectives (From admission, onward)    Ordered     Dose/Rate Route Frequency Start Stop    21 0204  cefTRIAXone (ROCEPHIN) 2 g in sodium chloride 0.9 % 100 mL IVPB-VTB        Ordering Provider: eRnu Fischer, DO   \"And\" Linked Group Details    2 g  200 mL/hr over " "30 Minutes Intravenous Every 24 Hours 11/21/21 0000 11/27/21 2359    11/20/21 0204  azithromycin 500 MG/250 ML 0.9% NS IVPB (MBP)        Ordering Provider: Renu Fischer,    \"And\" Linked Group Details    500 mg  over 60 Minutes Intravenous Every 24 Hours 11/20/21 0204 11/25/21 0259    11/19/21 2325  cefTRIAXone (ROCEPHIN) 2 g in sodium chloride 0.9 % 100 mL IVPB-VTB        Ordering Provider: Jovanna Cárdenas APRN    2 g  200 mL/hr over 30 Minutes Intravenous Once 11/19/21 2327 11/20/21 0054 11/19/21 2325  doxycycline (VIBRAMYCIN) 100 mg in sodium chloride 0.9 % 100 mL IVPB-VTB        Ordering Provider: Jovanna Cárdenas APRN    100 mg Intravenous Once 11/19/21 2327 11/20/21 0055        Current Diuretic Therapy:  Diuretics (From admission, onward)    None        ----------------------------------------------------------------------------------------------------------------------  Vital Signs:  Temp:  [97.6 °F (36.4 °C)-98.4 °F (36.9 °C)] 97.9 °F (36.6 °C)  Heart Rate:  [71-96] 77  Resp:  [18-20] 18  BP: (108-155)/(51-90) 122/59  SpO2:  [90 %-97 %] 97 %  on  Flow (L/min):  [2] 2;   Device (Oxygen Therapy): nasal cannula  Body mass index is 39.97 kg/m².    Wt Readings from Last 3 Encounters:   11/20/21 126 kg (278 lb 9.6 oz)     Intake & Output (last 3 days)       11/17 0701 11/18 0700 11/18 0701 11/19 0700 11/19 0701  11/20 0700 11/20 0701  11/21 0700    P.O.   240 570    Total Intake(mL/kg)   240 (1.9) 570 (4.5)    Urine (mL/kg/hr)   500     Total Output   500     Net   -260 +570                Diet Regular  ----------------------------------------------------------------------------------------------------------------------  Physical exam:  Constitutional:  Well-developed and well-nourished.  No acute distress.  Resting comfortably in bed at time of exam.   HENT:  Head:  Normocephalic and atraumatic.  Mouth:  Moist mucous membranes.    Eyes:  Conjunctivae and EOM are normal. No scleral icterus.  "   Cardiovascular:  Normal rate, regular rhythm and normal heart sounds with no murmur.  Pulmonary/Chest:  No respiratory distress, diffuse wheezing throughout with occasional scattered rhonchi., no crackles, with normal breath sounds and good air movement.  Abdominal: Obese, soft.  Bowel sounds are normal.  No distension and no tenderness.   Musculoskeletal:  No edema, no tenderness, and no deformity.  No red or swollen joints anywhere.   Neurological:  Alert and oriented to person, place, and time.  No cranial nerve deficit.    Skin:  Skin is warm and dry. No rash or lesion noted. No pallor.   Peripheral vascular:  Pulses in all 4 extremities with no clubbing, no cyanosis, no edema.  Psychiatric: Appropriate mood and affect, pleasant.   ----------------------------------------------------------------------------------------------------------------------  Tele:    ----------------------------------------------------------------------------------------------------------------------  Results from last 7 days   Lab Units 11/20/21  1240 11/20/21  0356 11/20/21  0135 11/19/21 2241 11/19/21 2241   CRP mg/dL  --  10.78*  --   --  11.80*   LACTATE mmol/L 1.9 1.5 1.3   < > 2.4*   WBC 10*3/mm3  --  25.72*  --   --  32.89*   HEMOGLOBIN g/dL  --  10.5*  --   --  11.5*   HEMATOCRIT %  --  34.9  --   --  38.5   MCV fL  --  68.8*  --   --  69.6*   MCHC g/dL  --  30.1*  --   --  29.9*   PLATELETS 10*3/mm3  --  431  --   --  497*    < > = values in this interval not displayed.     Results from last 7 days   Lab Units 11/19/21  2244   PH, ARTERIAL pH units 7.498*   PO2 ART mm Hg 53.2*   PCO2, ARTERIAL mm Hg 32.9*   HCO3 ART mmol/L 25.6     Results from last 7 days   Lab Units 11/20/21  0356 11/19/21  2241   SODIUM mmol/L 139 142   POTASSIUM mmol/L 4.4 3.9   CHLORIDE mmol/L 104 102   CO2 mmol/L 23.4 25.8   BUN mg/dL 15 15   CREATININE mg/dL 0.61 0.64   EGFR IF NONAFRICN AM mL/min/1.73 112 106   CALCIUM mg/dL 8.8 9.5   GLUCOSE mg/dL  113* 98   ALBUMIN g/dL 3.81 4.34   BILIRUBIN mg/dL 0.3 0.3   ALK PHOS U/L 80 80   AST (SGOT) U/L 31 30   ALT (SGPT) U/L 17 17   Estimated Creatinine Clearance: 185.9 mL/min (by C-G formula based on SCr of 0.61 mg/dL).  No results found for: AMMONIA  Results from last 7 days   Lab Units 11/19/21 2241   TROPONIN T ng/mL <0.010             Hemoglobin A1C   Date/Time Value Ref Range Status   11/20/2021 0356 5.30 4.80 - 5.60 % Final     Lab Results   Component Value Date    TSH 0.778 11/20/2021    FREET4 1.20 11/20/2021     Lab Results   Component Value Date    PREGTESTUR Negative 11/19/2021     Pain Management Panel     Pain Management Panel Latest Ref Rng & Units 11/19/2021    AMPHETAMINES SCREEN, URINE Negative Negative    BARBITURATES SCREEN Negative Negative    BENZODIAZEPINE SCREEN, URINE Negative Negative    BUPRENORPHINEUR Negative Negative    COCAINE SCREEN, URINE Negative Negative    METHADONE SCREEN, URINE Negative Positive(A)    METHAMPHETAMINEUR Negative Negative        Brief Urine Lab Results  (Last result in the past 365 days)      Color   Clarity   Blood   Leuk Est   Nitrite   Protein   CREAT   Urine HCG        11/19/21 2241               Negative       11/19/21 2241 Yellow   Clear   Negative   Negative   Negative   Trace               No results found for: BLOODCX  No results found for: URINECX  No results found for: WOUNDCX  No results found for: STOOLCX  No results found for: RESPCX  No results found for: AFBCX  Results from last 7 days   Lab Units 11/20/21  1240 11/20/21  0356 11/20/21  0135 11/19/21 2241   PROCALCITONIN ng/mL  --   --   --  0.04   LACTATE mmol/L 1.9 1.5 1.3 2.4*   CRP mg/dL  --  10.78*  --  11.80*       I have personally looked at the labs and they are summarized above.  ----------------------------------------------------------------------------------------------------------------------  Detailed radiology reports for the last 24 hours:    Imaging Results (Last 24 Hours)      Procedure Component Value Units Date/Time    US Venous Doppler Lower Extremity Bilateral (duplex) [[id number]] Collected: 11/20/21 1130     Updated: 11/20/21 1133    Narrative:      EXAMINATION: US VENOUS DOPPLER LOWER EXTREMITY BILATERAL (DUPLEX)-      CLINICAL INDICATION:  elevated d-dimer; J96.01-Acute respiratory failure  with hypoxia; A41.9-Sepsis, unspecified organism; J18.9-Pneumonia,  unspecified organism     TECHNIQUE: Multiplanar gray scale and Doppler vascular sonographic  imaging of the deep veins of BILATERAL lower extremity, without and with  compression.      COMPARISON: NONE      FINDINGS:   Deep Veins: The visualized deep veins demonstrate flow and are  compressible. No evidence of deep venous thrombosis.   Superficial veins: Unremarkable. Saphenofemoral junction is patent  without thrombus.  Soft tissues: Within normal limits.       Impression:      No DVT.     This report was finalized on 11/20/2021 11:31 AM by Dr. Amrit Lyon MD.       CT Chest Pulmonary Embolism [[id number]] Collected: 11/20/21 0057     Updated: 11/20/21 0059    Narrative:      CT CHEST PULMONARY EMBOLISM W CONTRAST    INDICATION:   Worsening shortness of breath tonight.    TECHNIQUE:   CT angiogram of the chest with IV contrast. 3-D reconstructions were obtained and reviewed.   Radiation dose reduction techniques included automated exposure control or exposure modulation based on body size. Count of known CT and cardiac nuc med studies  performed in previous 12 months: 0.     COMPARISON:   None available.    FINDINGS:   Adequate opacification of the pulmonary arteries with no filling defects. Thoracic aorta normal in course and caliber without dissection. Heart size is normal. No pericardial effusion.    No pleural effusion. No pneumothorax. Patchy groundglass infiltrates are scattered throughout both lungs.    Visualized upper abdomen is unremarkable.    No acute osseous abnormality.      Impression:      1. Negative for  pulmonary embolus.  2. Negative for thoracic aortic aneurysm/dissection.  3. Patchy groundglass infiltrates are scattered throughout both lungs, consistent with multifocal pneumonia. COVID-19 reporting criteria: Typical. Commonly reported imaging features of COVID-19 pneumonia are present. Other processes such as influenza  pneumonia and organizing pneumonia can cause a similar imaging pattern.    Signer Name: Robinson Medina MD   Signed: 11/20/2021 12:57 AM   Workstation Name: [id number]    [facility]    XR Chest 1 View [[id number]] Collected: 11/19/21 2250     Updated: 11/19/21 2252    Narrative:      CR Chest 1 Vw    INDICATION:   Shortness of breath     COMPARISON:    None available.    FINDINGS:  Portable AP view(s) of the chest.  The heart and mediastinal contours are normal. There are bilateral pulmonary opacities consistent with pneumonia. No pneumothorax or pleural effusion. Plate fixation is identified involving the left clavicle.      Impression:      There is bilateral pneumonia.    Signer Name: Arlette Daivs MD   Signed: 11/19/2021 10:50 PM   Workstation Name: [id number]    [facility]        Assessment & Plan      Severe sepsis  Community-acquired pneumonia  Acute exacerbation of asthma  Acute hypoxemic respiratory failure    -Patient with diffuse multifocal bilateral pneumonia on imaging.  Initial ABG with a PO2 of 53 and an O2 saturation of 89% on room air.    -Continue Rocephin and azithromycin for empiric treatment of community-acquired pneumonia.  Of note patient's QT C is within normal limits however will obtain repeat EKG tomorrow a.m. as patient is also on methadone.    -MRSA nasal swab negative, strep pneumo antigen negative, respiratory panel also negative.  Sputum culture pending collection.    -Given concern for acute exacerbation of asthma we will continue with steroids with Solu-Medrol 40 mg every 12    -Incentive spirometry    -Scheduled  breathing treatments    Elevated D-dimer  Intermittent swelling of left lower extremity    -CT with PE protocol negative and duplex of lower extremity pending at this time..    -TTE ordered and pending at this time    Microcytic anemia  Thrombocytosis    -Likely representative of her acute infectious process at this time    -B12, folate, iron panel all ordered and pending    -Peripheral blood smear also ordered and pending at this time    Street of substance abuse  Chronic methadone    -Patient prescribed methadone dosage confirmed and continued    Smoking tobacco usage    -Nicotine replacement therapy, cessation counseled    Chronic back pain  Arthritis    -Supportive care    Morbid obesity    -Complicates all aspects of care    VTE Prophylaxis:   Mechanical Order History:     None      Pharmalogical Order History:      Ordered     Dose Route Frequency Stop    11/20/21 0204  enoxaparin (LOVENOX) syringe 40 mg         40 mg SC Every 24 Hours --                Disposition Home once medically stable and improved.    Eleno Oconnor DO  [facility]  11/20/21  14:31 EST

## 2021-11-20 NOTE — ED PROVIDER NOTES
Subjective   Patient is a 36-year-old female with past medical history significant for substance abuse.  She reports that she was at her primary care office recently and diagnosed with pneumonia and influenza B.  She states that she also had Covid in August 2021.  Patient reports that she has had progressively worsening shortness of breath.  She states that her PCP prescribed her Levaquin p.o., steroids, and gave her a shot of Rocephin in the office today.  Patient denies any fever, chest pain, nausea, vomiting, diarrhea, abdominal pain, syncope/near syncope, dizziness, headache, lower extremity edema, numbness/tingling or any other significant complaints.       Shortness of Breath  Severity:  Severe  Onset quality:  Sudden  Timing:  Constant  Progression:  Worsening  Context: activity    Relieved by:  Nothing  Worsened by:  Exertion, movement, deep breathing, activity and coughing  Associated symptoms: cough    Associated symptoms: no abdominal pain, no chest pain, no diaphoresis, no fever, no headaches, no syncope and no vomiting    Risk factors: obesity        Review of Systems   Constitutional: Negative.  Negative for diaphoresis and fever.   HENT: Positive for congestion.    Eyes: Negative.    Respiratory: Positive for cough and shortness of breath. Negative for chest tightness.    Cardiovascular: Negative.  Negative for chest pain and syncope.   Gastrointestinal: Negative.  Negative for abdominal pain and vomiting.   Endocrine: Negative.    Genitourinary: Negative.  Negative for dysuria.   Musculoskeletal: Negative.    Skin: Negative.    Allergic/Immunologic: Negative.    Neurological: Negative.  Negative for headaches.   Hematological: Negative.    Psychiatric/Behavioral: Negative.    All other systems reviewed and are negative.      Past Medical History:   Diagnosis Date   • Arthritis    • Asthma    • History of substance abuse (HCC)    • Tobacco use        Allergies   Allergen Reactions   • Bactrim  [Sulfamethoxazole-Trimethoprim] Hives       Past Surgical History:   Procedure Laterality Date   • ABDOMINAL SURGERY     • ANKLE SURGERY     •  SECTION      x3   • CLAVICLE SURGERY      metal plate in place   • TONSILLECTOMY         History reviewed. No pertinent family history.    Social History     Socioeconomic History   • Marital status: Single   Tobacco Use   • Smoking status: Current Every Day Smoker     Packs/day: 1.00     Years: 15.00     Pack years: 15.00   • Smokeless tobacco: Never Used   Substance and Sexual Activity   • Alcohol use: Never   • Drug use: Not Currently   • Sexual activity: Defer           Objective   Physical Exam  Vitals and nursing note reviewed.   Constitutional:       General: She is not in acute distress.     Appearance: She is well-developed. She is obese. She is ill-appearing and toxic-appearing. She is not diaphoretic.   HENT:      Head: Normocephalic and atraumatic.      Right Ear: External ear normal.      Left Ear: External ear normal.      Nose: Nose normal.      Mouth/Throat:      Mouth: Mucous membranes are moist.      Pharynx: Oropharynx is clear.   Eyes:      Conjunctiva/sclera: Conjunctivae normal.      Pupils: Pupils are equal, round, and reactive to light.   Neck:      Vascular: No JVD.      Trachea: No tracheal deviation.   Cardiovascular:      Rate and Rhythm: Normal rate and regular rhythm.      Heart sounds: Normal heart sounds. No murmur heard.      Pulmonary:      Effort: Pulmonary effort is normal. No respiratory distress.      Breath sounds: Examination of the right-upper field reveals wheezing. Examination of the left-upper field reveals wheezing. Examination of the right-middle field reveals wheezing. Examination of the left-middle field reveals wheezing. Wheezing present. No decreased breath sounds.   Abdominal:      General: Bowel sounds are normal.      Palpations: Abdomen is soft.      Tenderness: There is no abdominal tenderness.   Musculoskeletal:          General: No deformity. Normal range of motion.      Cervical back: Normal range of motion and neck supple.   Skin:     General: Skin is warm and dry.      Capillary Refill: Capillary refill takes less than 2 seconds.      Coloration: Skin is not pale.      Findings: No erythema or rash.   Neurological:      General: No focal deficit present.      Mental Status: She is alert and oriented to person, place, and time.      Cranial Nerves: No cranial nerve deficit.   Psychiatric:         Mood and Affect: Mood is anxious.         Behavior: Behavior normal.         Thought Content: Thought content normal.         Procedures        Results for orders placed or performed during the hospital encounter of 11/19/21   Respiratory Panel PCR w/COVID-19(SARS-CoV-2) ERIC/JOSE/JO/PAD/COR/MAD/PRANEETH In-House, NP Swab in UTM/VTM, 3-4 HR TAT - Swab, Nasopharynx    Specimen: Nasopharynx; Swab   Result Value Ref Range    ADENOVIRUS, PCR Not Detected Not Detected    Coronavirus 229E Not Detected Not Detected    Coronavirus HKU1 Not Detected Not Detected    Coronavirus NL63 Not Detected Not Detected    Coronavirus OC43 Not Detected Not Detected    COVID19 Not Detected Not Detected - Ref. Range    Human Metapneumovirus Not Detected Not Detected    Human Rhinovirus/Enterovirus Not Detected Not Detected    Influenza A PCR Not Detected Not Detected    Influenza B PCR Not Detected Not Detected    Parainfluenza Virus 1 Not Detected Not Detected    Parainfluenza Virus 2 Not Detected Not Detected    Parainfluenza Virus 3 Not Detected Not Detected    Parainfluenza Virus 4 Not Detected Not Detected    RSV, PCR Not Detected Not Detected    Bordetella pertussis pcr Not Detected Not Detected    Bordetella parapertussis PCR Not Detected Not Detected    Chlamydophila pneumoniae PCR Not Detected Not Detected    Mycoplasma pneumo by PCR Not Detected Not Detected   MRSA Screen, PCR (Inpatient) - Swab, Nares    Specimen: Nares; Swab   Result Value Ref  Range    MRSA PCR Negative Negative    Staph aureus by PCR Negative Negative   Legionella Antigen, Urine - Urine, Urine, Clean Catch    Specimen: Urine, Clean Catch   Result Value Ref Range    LEGIONELLA ANTIGEN, URINE Negative Negative   Comprehensive Metabolic Panel    Specimen: Arm, Right; Blood   Result Value Ref Range    Glucose 98 65 - 99 mg/dL    BUN 15 6 - 20 mg/dL    Creatinine 0.64 0.57 - 1.00 mg/dL    Sodium 142 136 - 145 mmol/L    Potassium 3.9 3.5 - 5.2 mmol/L    Chloride 102 98 - 107 mmol/L    CO2 25.8 22.0 - 29.0 mmol/L    Calcium 9.5 8.6 - 10.5 mg/dL    Total Protein 7.8 6.0 - 8.5 g/dL    Albumin 4.34 3.50 - 5.20 g/dL    ALT (SGPT) 17 1 - 33 U/L    AST (SGOT) 30 1 - 32 U/L    Alkaline Phosphatase 80 39 - 117 U/L    Total Bilirubin 0.3 0.0 - 1.2 mg/dL    eGFR Non African Amer 106 >60 mL/min/1.73    Globulin 3.5 gm/dL    A/G Ratio 1.3 g/dL    BUN/Creatinine Ratio 23.4 7.0 - 25.0    Anion Gap 14.2 5.0 - 15.0 mmol/L   Lactate Dehydrogenase    Specimen: Arm, Right; Blood   Result Value Ref Range     (H) 135 - 214 U/L   D-dimer, Quantitative    Specimen: Arm, Right; Blood   Result Value Ref Range    D-Dimer, Quantitative 1.00 (C) 0.00 - 0.50 MCGFEU/mL   C-reactive Protein    Specimen: Arm, Right; Blood   Result Value Ref Range    C-Reactive Protein 11.80 (H) 0.00 - 0.50 mg/dL   Lactic Acid, Plasma    Specimen: Arm, Right; Blood   Result Value Ref Range    Lactate 2.4 (C) 0.5 - 2.0 mmol/L   Ferritin    Specimen: Arm, Right; Blood   Result Value Ref Range    Ferritin 255.10 (H) 13.00 - 150.00 ng/mL   Urinalysis With Microscopic If Indicated (No Culture) - Urine, Clean Catch    Specimen: Urine, Clean Catch   Result Value Ref Range    Color, UA Yellow Yellow, Straw    Appearance, UA Clear Clear    pH, UA 7.0 5.0 - 8.0    Specific Gravity, UA 1.028 1.005 - 1.030    Glucose, UA Negative Negative    Ketones, UA Negative Negative    Bilirubin, UA Negative Negative    Blood, UA Negative Negative    Protein,  UA Trace (A) Negative    Leuk Esterase, UA Negative Negative    Nitrite, UA Negative Negative    Urobilinogen, UA 0.2 E.U./dL 0.2 - 1.0 E.U./dL   Troponin    Specimen: Arm, Right; Blood   Result Value Ref Range    Troponin T <0.010 0.000 - 0.030 ng/mL   CBC Auto Differential    Specimen: Arm, Right; Blood   Result Value Ref Range    WBC 32.89 (C) 3.40 - 10.80 10*3/mm3    RBC 5.53 (H) 3.77 - 5.28 10*6/mm3    Hemoglobin 11.5 (L) 12.0 - 15.9 g/dL    Hematocrit 38.5 34.0 - 46.6 %    MCV 69.6 (L) 79.0 - 97.0 fL    MCH 20.8 (L) 26.6 - 33.0 pg    MCHC 29.9 (L) 31.5 - 35.7 g/dL    RDW 20.0 (H) 12.3 - 15.4 %    RDW-SD 48.5 37.0 - 54.0 fl    MPV 10.1 6.0 - 12.0 fL    Platelets 497 (H) 140 - 450 10*3/mm3   Blood Gas, Arterial With Co-Ox    Specimen: Arterial Blood   Result Value Ref Range    Site Right Radial     Ryan's Test Positive     pH, Arterial 7.498 (H) 7.350 - 7.450 pH units    pCO2, Arterial 32.9 (L) 35.0 - 45.0 mm Hg    pO2, Arterial 53.2 (C) 83.0 - 108.0 mm Hg    HCO3, Arterial 25.6 20.0 - 26.0 mmol/L    Base Excess, Arterial 2.6 (H) 0.0 - 2.0 mmol/L    O2 Saturation, Arterial 89.0 (L) 94.0 - 99.0 %    Hemoglobin, Blood Gas 11.5 (L) 13.5 - 17.5 g/dL    Hematocrit, Blood Gas 35.4 (L) 38.0 - 51.0 %    Oxyhemoglobin 87.8 (L) 94 - 99 %    Methemoglobin 0.00 0.00 - 3.00 %    Carboxyhemoglobin 1.3 0 - 5 %    A-a Gradiant 54.1 0.0 - 300.0 mmHg    CO2 Content 26.6 22 - 33 mmol/L    Temperature 0.0 C    Barometric Pressure for Blood Gas 735 mmHg    Modality Room Air     FIO2 21 %    Ventilator Mode NA     Note      Notified Who ER  and RN     Notified By [id number]     Notified Time 11/19/2021 22:46     Collected by [id number]     pH, Temp Corrected      pCO2, Temperature Corrected      pO2, Temperature Corrected     Scan Slide    Specimen: Arm, Right; Blood   Result Value Ref Range    Scan Slide     Manual Differential    Specimen: Arm, Right; Blood   Result Value Ref Range    Neutrophil % 76.0 42.7 - 76.0 %    Lymphocyte % 15.0  (L) 19.6 - 45.3 %    Monocyte % 3.0 (L) 5.0 - 12.0 %    Eosinophil % 1.0 0.3 - 6.2 %    Bands %  5.0 0.0 - 5.0 %    Neutrophils Absolute 26.64 (H) 1.70 - 7.00 10*3/mm3    Lymphocytes Absolute 4.93 (H) 0.70 - 3.10 10*3/mm3    Monocytes Absolute 0.99 (H) 0.10 - 0.90 10*3/mm3    Eosinophils Absolute 0.33 0.00 - 0.40 10*3/mm3    Hypochromia Slight/1+ None Seen    Microcytes Large/3+ None Seen    Platelet Estimate Increased Normal   STAT Lactic Acid, Reflex    Specimen: Arm, Right; Blood   Result Value Ref Range    Lactate 1.3 0.5 - 2.0 mmol/L   Pregnancy, Urine - Urine, Clean Catch    Specimen: Urine, Clean Catch   Result Value Ref Range    HCG, Urine QL Negative Negative   Urine Drug Screen - Urine, Clean Catch    Specimen: Urine, Clean Catch   Result Value Ref Range    THC, Screen, Urine Negative Negative    Phencyclidine (PCP), Urine Negative Negative    Cocaine Screen, Urine Negative Negative    Methamphetamine, Ur Negative Negative    Opiate Screen Negative Negative    Amphetamine Screen, Urine Negative Negative    Benzodiazepine Screen, Urine Negative Negative    Tricyclic Antidepressants Screen Negative Negative    Methadone Screen, Urine Positive (A) Negative    Barbiturates Screen, Urine Negative Negative    Oxycodone Screen, Urine Negative Negative    Propoxyphene Screen Negative Negative    Buprenorphine, Screen, Urine Negative Negative   Green Top (Gel)   Result Value Ref Range    Extra Tube Hold for add-ons.    Lavender Top   Result Value Ref Range    Extra Tube hold for add-on    Light Blue Top   Result Value Ref Range    Extra Tube hold for add-on          ED Course  ED Course as of 11/20/21 0350   Fri Nov 19, 2021   2252 EKG: Sinus rhythm, rate 88, , QRS 84, QTc 418, no STEMI. [DH]   2339 XR Chest 1 View  FINDINGS:  Portable AP view(s) of the chest.  The heart and mediastinal contours are normal. There are bilateral pulmonary opacities consistent with pneumonia. No pneumothorax or pleural effusion.  Plate fixation is identified involving the left clavicle.     IMPRESSION:  There is bilateral pneumonia. [MB]   Sat Nov 20, 2021   0105 CT Chest Pulmonary Embolism  IMPRESSION:  1. Negative for pulmonary embolus.  2. Negative for thoracic aortic aneurysm/dissection.  3. Patchy groundglass infiltrates are scattered throughout both lungs, consistent with multifocal pneumonia. COVID-19 reporting criteria: Typical. Commonly reported imaging features of COVID-19 pneumonia are present. Other processes such as influenza  pneumonia and organizing pneumonia can cause a similar imaging pattern.    [MB]   0106 Hospitalist paged [MB]   0130 Case d/w , agrees with admission, will admit to Hospitalist service. [MB]      ED Course User Index  [DH] Lucius Buitrago MD  [MB] Jovanna Cárdenas, KEON                                           [facility]    Final diagnoses:   Acute respiratory failure with hypoxia (HCC)   Sepsis, due to unspecified organism, unspecified whether acute organ dysfunction present (HCC)   Pneumonia of both lungs due to infectious organism, unspecified part of lung       ED Disposition  ED Disposition     ED Disposition Condition Comment    Decision to Admit  Level of Care: Medical Telemetry [23]   Diagnosis: Sepsis due to pneumonia (HCC) [8838084]   Admitting Physician: EDDIE BUNDY [[id number]]   Certification: I Certify That Inpatient Hospital Services Are Medically Necessary For Greater Than 2 Midnights            No follow-up provider specified.       Medication List      No changes were made to your prescriptions during this visit.          Jovanna Cárdenas, KEON  11/20/21 2512

## 2021-11-20 NOTE — PAYOR COMM NOTE
"[facility]  RORY RICHARDSON  PHONE  [phone]  FAX  [phone]  NPI:  [id number]    ADMISSION NOTIFICATION    Collett, Jessica A (36 y.o. Female)             Date of Birth Social Security Number Address Home Phone MRN    1985  [address]   [place] [address] [phone] [id number]    [facility] Marital Status             None Single       Admission Date Admission Type Admitting Provider Attending Provider Department, Room/Bed    11/19/21 Emergency Renu Fischer DO Cagle, William, DO [address], 3349/2S    Discharge Date Discharge Disposition Discharge Destination                         Attending Provider: Eleno Oconnor DO    Allergies: Bactrim [Sulfamethoxazole-trimethoprim]    Isolation: None   Infection: COVID (rule out) (11/19/21)   Code Status: CPR   Advance Care Planning Activity    Ht: 177.8 cm (70\")   Wt: 126 kg (278 lb 9.6 oz)    Admission Cmt: None   Principal Problem: Sepsis due to pneumonia (HCC) [J18.9,A41.9]                 Active Insurance as of 11/19/2021     Primary Coverage     Payor Plan Insurance Group Employer/Plan Group    [employer] [place] [employer]ICA[employer]      Payor Plan Address Payor Plan Phone Number Payor Plan Fax Number Effective Dates    [address] [address] [phone]  11/19/2021 - None Entered    [facility] [id number]       Subscriber Name Subscriber Birth Date Member ID       COLLETT,JESSICA A 1985 [id number]                 Emergency Contacts      (Rel.) Home Phone Work Phone Mobile Phone    DANNY VALLADARES (Significant Other) [phone] -- --              "

## 2021-11-20 NOTE — PLAN OF CARE
Goal Outcome Evaluation:  Plan of Care Reviewed With: patient        Progress: no change  Outcome Summary: Received patient from ER with diagnosis of pneumonia.  O2 at 2l/nc in use. Assessment complete.  Nasal swab for mrsa sent.  Will continue to monitor.

## 2021-11-20 NOTE — PROGRESS NOTES
Pharmacy was asked to  the patient on smoking cessation.  The patient reports she has smoked for about 15 years and smokes 1-1.5 ppd.  She hasn't smoked in the last 3 days and feels like she is ready to quit.  She feels like the nicotine patch is working well for her.     Counseled Pt on the use of nicotine patch and recommend that she continue to use one after discharge.  She will let us know at discharge if she would like to schedule an outpatient tobacco cessation appointment.

## 2021-11-20 NOTE — ED NOTES
EKG completed by [employer] @ 3131 and given to Dr. Buitrago @ 4765.     Alexandria Enriquez  11/19/21 9146

## 2021-11-20 NOTE — H&P
[facility] Medicine Services  HISTORY & PHYSICAL    Patient Identification:  Name:  Jessica A Collett  Age:  36 y.o.  Sex:  female  :  1985  MRN:  [id number]   Visit Number:  [id number]  Admit Date: 2021   Primary Care Physician:  Lydia Bhagat APRN     Subjective     Chief complaint:   Chief Complaint   Patient presents with   • Shortness of Breath     History of presenting illness:   Patient is a 36 y.o. female with past medical history significant for history of substance abuse, smoking tobacco use, that presented to the [facility] emergency department for evaluation of shortness of breath.    The patient states that she developed a nonproductive cough and head congestion around 3 days ago that has progressively worsened.  She states that on  her cough became productive with yellow/brown sputum and she developed dyspnea, diaphoresis, wheezing, and pleuritic chest pain.  As a result, she saw her PCP on  and states she was given 2 shots in their office (an antibiotic and steroid).  She was also given a prescription for Levaquin and a steroid pack.  She states she has taken 1 dose of the Levaquin and 3 doses of the steroid pack.  She has also been using albuterol treatments at home which have not improved her symptoms.  She reports that her PCP was concerned for COVID-19 but this resulted negative, she states she was told she had influenza B however.  She denies any known sick contacts, fever, chills, sore throat, palpitations, abdominal pain, nausea, vomiting, diarrhea, dysuria.  She admits to having COVID-19 in August and reports she was diagnosed with pneumonia.  She received an outpatient monoclonal antibody infusion at [facility] and states she recovered well overall.  Furthermore, she admits to having left lower extremity edema that began around 1 month ago.  She states that she noticed the edema mainly when she was on her feet for prolonged  periods of time.  Her PCP was concerned for a possible DVT and performed an ultrasound of the left lower extremity in September which was negative.  She was started on HCTZ and a medication for arthritis and states her edema has improved.  She has a smoking tobacco history of 1 pack of cigarettes a day for the past 15 years.  She denies any current illicit drug use but does admit to a history of drug abuse.  She currently receives methadone.  She denies wearing any oxygen therapy at home is currently requiring 2 L NC.    Upon arrival to the ED, vitals were temperature 98.0 °F, pulse 96, respirations 20, /70, SPO2 91% on room air.  ABG of pH 7.498, PCO2 32.9, PO2 53.2, O2 saturation 89.0 on room air.  Troponin T negative x1.  .  CMP unremarkable.  Ferritin 255.1.  CRP 11.8, lactate 2.4.  D-dimer 1.0.  CBC with WBC 32.89, RBC 5.53, hemoglobin 11.5, MCV 69.6, MCH 20.8, MCHC 29.9, platelets 197, 5% bands, absolute neutrophils 26.64.  UA with trace proteinuria, otherwise unremarkable.  Urine hCG negative.  Blood cultures pending x2.  Respiratory panel unremarkable.  Chest x-ray shows bilateral pneumonia.  CT of the chest with PE protocol is negative for PE, patchy groundglass infiltrates are scattered throughout both lungs consistent with multifocal pneumonia.  EKG with normal sinus rhythm, heart rate 88 bpm, QTc 418 MS.    In the emergency department the patient received IV Rocephin 2 g, IV doxycycline, IV Solu-Medrol 125 mg, IV NS 1 L bolus.    Patient has been admitted to the medical telemetry floor for further evaluation and treatment   ---------------------------------------------------------------------------------------------------------------------   Review of Systems   Constitutional: Positive for appetite change (Decreased) and diaphoresis. Negative for chills, fatigue and fever.   HENT: Positive for congestion. Negative for sinus pain and sore throat.    Respiratory: Positive for cough (Productive  with yellow/brown sputum), shortness of breath and wheezing.    Cardiovascular: Positive for chest pain (Pleuritic) and leg swelling (Intermittent left lower extremity). Negative for palpitations.   Gastrointestinal: Negative for abdominal pain, constipation, diarrhea, nausea and vomiting.   Genitourinary: Negative for dysuria and frequency.   Musculoskeletal: Positive for back pain (Chronic) and joint swelling (Intermittent left ankle edema). Negative for arthralgias, gait problem and myalgias.   Skin: Negative for rash and wound.   Neurological: Negative for dizziness, light-headedness and headaches.   Psychiatric/Behavioral: Negative for confusion. The patient is not nervous/anxious.       ---------------------------------------------------------------------------------------------------------------------   Past Medical History:   Diagnosis Date   • Arthritis    • Asthma    • History of substance abuse (HCC)    • Tobacco use      Past Surgical History:   Procedure Laterality Date   • ABDOMINAL SURGERY     • ANKLE SURGERY     •  SECTION      x3   • CLAVICLE SURGERY      metal plate in place   • TONSILLECTOMY       History reviewed. No pertinent family history.  Social History     Socioeconomic History   • Marital status: Single   Tobacco Use   • Smoking status: Current Every Day Smoker     Packs/day: 1.00     Years: 15.00     Pack years: 15.00   • Smokeless tobacco: Never Used   Substance and Sexual Activity   • Alcohol use: Never   • Drug use: Not Currently   • Sexual activity: Defer     ---------------------------------------------------------------------------------------------------------------------   Allergies:  Bactrim [sulfamethoxazole-trimethoprim]  ---------------------------------------------------------------------------------------------------------------------   Medications below are reported home medications pulling from within the system; at this time, these medications have not been  reconciled unless otherwise specified and are in the verification process for further verifcation as current home medications.    Prior to Admission Medications     None        ---------------------------------------------------------------------------------------------------------------------    Objective     Hospital Scheduled Meds:  [START ON 11/21/2021] cefTRIAXone, 2 g, Intravenous, Q24H   And  azithromycin, 500 mg, Intravenous, Q24H  enoxaparin, 40 mg, Subcutaneous, Q24H  nicotine, 1 patch, Transdermal, Q24H  sodium chloride, 10 mL, Intravenous, Q12H      sodium chloride, 100 mL/hr, Last Rate: 100 mL/hr (11/20/21 0220)        Current listed hospital scheduled medications may not yet reflect those currently placed in orders that are signed and held, awaiting patient's arrival to floor/unit.    ---------------------------------------------------------------------------------------------------------------------   Vital Signs:  Temp:  [98 °F (36.7 °C)-98.4 °F (36.9 °C)] 98.4 °F (36.9 °C)  Heart Rate:  [81-96] 86  Resp:  [18-20] 18  BP: (108-155)/(51-90) 112/62  Mean Arterial Pressure (Non-Invasive) for the past 24 hrs (Last 3 readings):   Noninvasive MAP (mmHg)   11/20/21 0133 99   11/20/21 0118 84   11/20/21 0102 85     SpO2 Percentage    11/20/21 0133 11/20/21 0200 11/20/21 0253   SpO2: 92% 94% 93%     SpO2:  [90 %-97 %] 93 %  on  Flow (L/min):  [2] 2;   Device (Oxygen Therapy): nasal cannula    Body mass index is 39.97 kg/m².  Wt Readings from Last 3 Encounters:   11/20/21 126 kg (278 lb 9.6 oz)     ---------------------------------------------------------------------------------------------------------------------   Physical Exam:  Physical Exam  Constitutional:       General: She is awake. She is not in acute distress.     Appearance: Normal appearance. She is well-developed. She is morbidly obese.      Interventions: Nasal cannula in place.      Comments: 2 L NC   HENT:      Head: Normocephalic and  atraumatic.   Eyes:      General: Lids are normal.   Cardiovascular:      Rate and Rhythm: Normal rate and regular rhythm.      Pulses: Normal pulses. No decreased pulses.           Dorsalis pedis pulses are 2+ on the right side and 2+ on the left side.        Posterior tibial pulses are 2+ on the right side and 2+ on the left side.      Heart sounds: Normal heart sounds. No murmur heard.  No friction rub. No gallop.    Pulmonary:      Effort: Pulmonary effort is normal. No tachypnea or bradypnea.      Breath sounds: Examination of the right-upper field reveals rhonchi. Examination of the left-upper field reveals rhonchi. Wheezing (Diffuse inspiratory and expiratory), rhonchi and rales present.   Abdominal:      General: Bowel sounds are normal.      Palpations: Abdomen is soft.      Tenderness: There is no abdominal tenderness.   Musculoskeletal:      Right lower leg: No edema.      Left lower leg: No edema.      Comments: Negative Homans' sign   Skin:     Findings: No abrasion, ecchymosis or erythema.   Neurological:      General: No focal deficit present.      Mental Status: She is alert and oriented to person, place, and time. Mental status is at baseline.   Psychiatric:         Speech: Speech normal.         Behavior: Behavior is cooperative.         Cognition and Memory: Cognition normal.       ---------------------------------------------------------------------------------------------------------------------  EKG:    Pending cardiology read.  Per my evaluation, normal sinus rhythm, heart rate 88, QTc 418 MS.    Telemetry:    Patient is not currently on telemetry    I have personally reviewed the EKG  ---------------------------------------------------------------------------------------------------------------------   Results from last 7 days   Lab Units 11/19/21  2241   TROPONIN T ng/mL <0.010         Results from last 7 days   Lab Units 11/19/21 2244   PH, ARTERIAL pH units 7.498*   PO2 ART mm Hg 53.2*    PCO2, ARTERIAL mm Hg 32.9*   HCO3 ART mmol/L 25.6     Results from last 7 days   Lab Units 11/20/21  0135 11/19/21  2241   CRP mg/dL  --  11.80*   LACTATE mmol/L 1.3 2.4*   WBC 10*3/mm3  --  32.89*   HEMOGLOBIN g/dL  --  11.5*   HEMATOCRIT %  --  38.5   MCV fL  --  69.6*   MCHC g/dL  --  29.9*   PLATELETS 10*3/mm3  --  497*     Results from last 7 days   Lab Units 11/19/21  2241   SODIUM mmol/L 142   POTASSIUM mmol/L 3.9   CHLORIDE mmol/L 102   CO2 mmol/L 25.8   BUN mg/dL 15   CREATININE mg/dL 0.64   EGFR IF NONAFRICN AM mL/min/1.73 106   CALCIUM mg/dL 9.5   GLUCOSE mg/dL 98   ALBUMIN g/dL 4.34   BILIRUBIN mg/dL 0.3   ALK PHOS U/L 80   AST (SGOT) U/L 30   ALT (SGPT) U/L 17   Estimated Creatinine Clearance: 177.2 mL/min (by C-G formula based on SCr of 0.64 mg/dL).    Pain Management Panel     Pain Management Panel Latest Ref Rng & Units 11/19/2021    AMPHETAMINES SCREEN, URINE Negative Negative    BARBITURATES SCREEN Negative Negative    BENZODIAZEPINE SCREEN, URINE Negative Negative    BUPRENORPHINEUR Negative Negative    COCAINE SCREEN, URINE Negative Negative    METHADONE SCREEN, URINE Negative Positive(A)    METHAMPHETAMINEUR Negative Negative        I have personally reviewed the above laboratory results.   ---------------------------------------------------------------------------------------------------------------------  Imaging Results (Last 7 Days)     Procedure Component Value Units Date/Time    CT Chest Pulmonary Embolism [[id number]] Collected: 11/20/21 0057     Updated: 11/20/21 0059    Narrative:      CT CHEST PULMONARY EMBOLISM W CONTRAST    INDICATION:   Worsening shortness of breath tonight.    TECHNIQUE:   CT angiogram of the chest with IV contrast. 3-D reconstructions were obtained and reviewed.   Radiation dose reduction techniques included automated exposure control or exposure modulation based on body size. Count of known CT and cardiac nuc med studies  performed in previous 12 months: 0.      COMPARISON:   None available.    FINDINGS:   Adequate opacification of the pulmonary arteries with no filling defects. Thoracic aorta normal in course and caliber without dissection. Heart size is normal. No pericardial effusion.    No pleural effusion. No pneumothorax. Patchy groundglass infiltrates are scattered throughout both lungs.    Visualized upper abdomen is unremarkable.    No acute osseous abnormality.      Impression:      1. Negative for pulmonary embolus.  2. Negative for thoracic aortic aneurysm/dissection.  3. Patchy groundglass infiltrates are scattered throughout both lungs, consistent with multifocal pneumonia. COVID-19 reporting criteria: Typical. Commonly reported imaging features of COVID-19 pneumonia are present. Other processes such as influenza  pneumonia and organizing pneumonia can cause a similar imaging pattern.    Signer Name: Robinson Medina MD   Signed: 11/20/2021 12:57 AM   Workstation Name: [id number]    [facility]    XR Chest 1 View [[id number]] Collected: 11/19/21 2250     Updated: 11/19/21 2252    Narrative:      CR Chest 1 Vw    INDICATION:   Shortness of breath     COMPARISON:    None available.    FINDINGS:  Portable AP view(s) of the chest.  The heart and mediastinal contours are normal. There are bilateral pulmonary opacities consistent with pneumonia. No pneumothorax or pleural effusion. Plate fixation is identified involving the left clavicle.      Impression:      There is bilateral pneumonia.    Signer Name: Arlette Daivs MD   Signed: 11/19/2021 10:50 PM   Workstation Name: [id number]    [facility]        I have personally reviewed the above radiology results.   ---------------------------------------------------------------------------------------------------------------------    Assessment & Plan      Active Hospital Problems    Diagnosis  POA   • **Sepsis due to pneumonia (HCC) [J18.9, A41.9]  Yes     #Severe sepsis  2/2 to bilateral multifocal CAP (pulse >90, Crp >2, Lactate >2, WBC >12, respiratory failure)   #?  Acute asthma exacerbation  #Acute hypoxic respiratory failure 2/2 to above   -Chest x-ray and CT of the chest with PE protocol reviewed, reveals multifocal bilateral pneumonia  -ABG with PO2 53.2, O2 saturation 89.0 on room air; currently with O2 saturation of 92% on 2 L nasal cannula  -Patient received IV Rocephin and IV doxycycline in the emergency department, will continue with IV Rocephin on the floor and IV azithromycin for its anti-inflammatory properties (QTC is within normal limits)  -Respiratory panel is unremarkable; obtain respiratory culture, urine Legionella, MRSA swab, strep pneumo  -Lactobacillus ordered for GI protection  -Atrovent ordered in setting of mild tachycardia and lactic acidosis  -Received IV Solu-Medrol 125 mg in ED; given suspected acute asthma exacerbation we will schedule IV Solu-Medrol 40 mg every 12 hours  -Initial lactate 2.4, repeat 1.3; suspect albuterol use at home contributing to elevation; also suspect steroids she received in the outpatient setting contributing to leukocytosis  -Repeat a.m. CRP and CBC  -Incentive spirometry given, encourage use  -Received NS 1 L bolus in ED; continue with maintenance fluids at 100 mL/h on the floor  -Monitor closely    #Elevated d-dimer   -CT PE protocol negative for pulmonary embolus  -Bilateral lower extremity venous Doppler ultrasound ordered to rule out DVT  -Subcutaneous Lovenox for VTE prophylaxis    #Intermittent left lower extremity edema  -Appears euvolemic on exam; no edema noted  -As stated above, obtaining bilateral lower extremity venous Doppler ultrasound to rule out DVT  -We will also obtain TTE  -Review home medications once available     #Microcytic anemia, ? acute vs. chronic   #Thrombocytosis   -Suspect acute, no prior labs to compare to   -H&H stable  -Obtain vitamin B12, folate, iron, iron panel; replace as necessary;  Ferritin 255.10  -Repeat AM CBC and monitor H&H closely   -Suspect thrombocytosis is reactive to infection; will go ahead and obtain peripheral blood smear   -If hemoglobin <7 or platelets <50,000 with active bleeding, will transfuse    #History of substance abuse, currently on methadone  -Obtain UDS  -Review home medications once available     #Smoking tobacco use   -Smoking cessation counseling given  -NRT ordered     #Chronic back pain  -Supportive care  -Lidoderm patches and Tylenol ordered as needed    #Arthritis  -Supportive care  -Review home medications once available     #Morbid obesity   -BMI 37.31 kg/m2  -Complicates all aspects of patient care     F/E/N:IV  mL/h. Replace electrolytes as necessary. Regular diet.   ---------------------------------------------------  DVT Prophylaxis: Subcutaneous Lovenox  GI Prophylaxis: Protonix  Activity: Up with assistance, fall precautions    The patient is considered to be a high risk patient due to: severe sepsis 2/2 to bilateral multifocal pneumonia, elevated d-dimer, acute hypoxic respiratory failure     INPATIENT status due to the need for care which can only be reasonably provided in an hospital setting such as aggressive/expedited ancillary services and/or consultation services, the necessity for IV medications, close physician monitoring and/or the possible need for procedures.  In such, I feel patient’s risk for adverse outcomes and need for care warrant INPATIENT evaluation and predict the patient’s care encounter to likely last beyond 2 midnights.    Code Status: FULL CODE     Disposition/Discharge planning: Plan for home at discharge.  Pending clinical course    I have discussed the patient's assessment and plan with the patient, nursing staff, and attending physician      Nola Palma PA-C  Hospitalist Service -- [facility]       11/20/21  03:10 EST    Attending Physician: Renu Fischer DO

## 2021-11-21 LAB
ANION GAP SERPL CALCULATED.3IONS-SCNC: 10.7 MMOL/L (ref 5–15)
ANISOCYTOSIS BLD QL: NORMAL
BASOPHILS # BLD AUTO: 0.03 10*3/MM3 (ref 0–0.2)
BASOPHILS NFR BLD AUTO: 0.1 % (ref 0–1.5)
BUN SERPL-MCNC: 20 MG/DL (ref 6–20)
BUN/CREAT SERPL: 30.3 (ref 7–25)
CALCIUM SPEC-SCNC: 8.3 MG/DL (ref 8.6–10.5)
CHLORIDE SERPL-SCNC: 107 MMOL/L (ref 98–107)
CO2 SERPL-SCNC: 22.3 MMOL/L (ref 22–29)
CREAT SERPL-MCNC: 0.66 MG/DL (ref 0.57–1)
D-LACTATE SERPL-SCNC: 1.6 MMOL/L (ref 0.5–2)
D-LACTATE SERPL-SCNC: 1.9 MMOL/L (ref 0.5–2)
DEPRECATED RDW RBC AUTO: 50.9 FL (ref 37–54)
EOSINOPHIL # BLD AUTO: 0.02 10*3/MM3 (ref 0–0.4)
EOSINOPHIL NFR BLD AUTO: 0.1 % (ref 0.3–6.2)
ERYTHROCYTE [DISTWIDTH] IN BLOOD BY AUTOMATED COUNT: 19.8 % (ref 12.3–15.4)
GFR SERPL CREATININE-BSD FRML MDRD: 102 ML/MIN/1.73
GLUCOSE SERPL-MCNC: 135 MG/DL (ref 65–99)
HCT VFR BLD AUTO: 34.8 % (ref 34–46.6)
HGB BLD-MCNC: 10 G/DL (ref 12–15.9)
HYPOCHROMIA BLD QL: NORMAL
IMM GRANULOCYTES # BLD AUTO: 0.22 10*3/MM3 (ref 0–0.05)
IMM GRANULOCYTES NFR BLD AUTO: 0.9 % (ref 0–0.5)
LYMPHOCYTES # BLD AUTO: 2.23 10*3/MM3 (ref 0.7–3.1)
LYMPHOCYTES NFR BLD AUTO: 9.2 % (ref 19.6–45.3)
MCH RBC QN AUTO: 20.5 PG (ref 26.6–33)
MCHC RBC AUTO-ENTMCNC: 28.7 G/DL (ref 31.5–35.7)
MCV RBC AUTO: 71.3 FL (ref 79–97)
MICROCYTES BLD QL: NORMAL
MONOCYTES # BLD AUTO: 1.18 10*3/MM3 (ref 0.1–0.9)
MONOCYTES NFR BLD AUTO: 4.9 % (ref 5–12)
NEUTROPHILS NFR BLD AUTO: 20.49 10*3/MM3 (ref 1.7–7)
NEUTROPHILS NFR BLD AUTO: 84.8 % (ref 42.7–76)
NRBC BLD AUTO-RTO: 0 /100 WBC (ref 0–0.2)
OVALOCYTES BLD QL SMEAR: NORMAL
PLAT MORPH BLD: NORMAL
PLATELET # BLD AUTO: 405 10*3/MM3 (ref 140–450)
PMV BLD AUTO: 10.2 FL (ref 6–12)
POTASSIUM SERPL-SCNC: 4.4 MMOL/L (ref 3.5–5.2)
RBC # BLD AUTO: 4.88 10*6/MM3 (ref 3.77–5.28)
SODIUM SERPL-SCNC: 140 MMOL/L (ref 136–145)
WBC NRBC COR # BLD: 24.17 10*3/MM3 (ref 3.4–10.8)

## 2021-11-21 PROCEDURE — 85025 COMPLETE CBC W/AUTO DIFF WBC: CPT | Performed by: STUDENT IN AN ORGANIZED HEALTH CARE EDUCATION/TRAINING PROGRAM

## 2021-11-21 PROCEDURE — 94760 N-INVAS EAR/PLS OXIMETRY 1: CPT

## 2021-11-21 PROCEDURE — 25010000002 AZITHROMYCIN PER 500 MG: Performed by: INTERNAL MEDICINE

## 2021-11-21 PROCEDURE — 80048 BASIC METABOLIC PNL TOTAL CA: CPT | Performed by: STUDENT IN AN ORGANIZED HEALTH CARE EDUCATION/TRAINING PROGRAM

## 2021-11-21 PROCEDURE — 94799 UNLISTED PULMONARY SVC/PX: CPT

## 2021-11-21 PROCEDURE — 25010000002 METHYLPREDNISOLONE PER 40 MG: Performed by: PHYSICIAN ASSISTANT

## 2021-11-21 PROCEDURE — 25010000002 ENOXAPARIN PER 10 MG: Performed by: INTERNAL MEDICINE

## 2021-11-21 PROCEDURE — 83605 ASSAY OF LACTIC ACID: CPT | Performed by: INTERNAL MEDICINE

## 2021-11-21 PROCEDURE — 85007 BL SMEAR W/DIFF WBC COUNT: CPT | Performed by: STUDENT IN AN ORGANIZED HEALTH CARE EDUCATION/TRAINING PROGRAM

## 2021-11-21 PROCEDURE — 25010000002 CEFTRIAXONE PER 250 MG: Performed by: INTERNAL MEDICINE

## 2021-11-21 PROCEDURE — 99232 SBSQ HOSP IP/OBS MODERATE 35: CPT | Performed by: STUDENT IN AN ORGANIZED HEALTH CARE EDUCATION/TRAINING PROGRAM

## 2021-11-21 RX ADMIN — METHYLPREDNISOLONE SODIUM SUCCINATE 40 MG: 40 INJECTION, POWDER, FOR SOLUTION INTRAMUSCULAR; INTRAVENOUS at 21:32

## 2021-11-21 RX ADMIN — SODIUM CHLORIDE 2 G: 9 INJECTION, SOLUTION INTRAVENOUS at 21:32

## 2021-11-21 RX ADMIN — Medication 150 MG: at 08:47

## 2021-11-21 RX ADMIN — HYDROCHLOROTHIAZIDE 25 MG: 25 TABLET ORAL at 08:47

## 2021-11-21 RX ADMIN — IPRATROPIUM BROMIDE AND ALBUTEROL SULFATE 3 ML: .5; 3 SOLUTION RESPIRATORY (INHALATION) at 13:24

## 2021-11-21 RX ADMIN — NICOTINE TRANSDERMAL SYSTEM 1 PATCH: 21 PATCH, EXTENDED RELEASE TRANSDERMAL at 08:48

## 2021-11-21 RX ADMIN — LIDOCAINE 2 PATCH: 50 PATCH CUTANEOUS at 08:46

## 2021-11-21 RX ADMIN — SODIUM CHLORIDE 2 G: 9 INJECTION, SOLUTION INTRAVENOUS at 00:39

## 2021-11-21 RX ADMIN — MELOXICAM 15 MG: 7.5 TABLET ORAL at 08:47

## 2021-11-21 RX ADMIN — SODIUM CHLORIDE, PRESERVATIVE FREE 10 ML: 5 INJECTION INTRAVENOUS at 21:32

## 2021-11-21 RX ADMIN — PANTOPRAZOLE SODIUM 40 MG: 40 TABLET, DELAYED RELEASE ORAL at 05:08

## 2021-11-21 RX ADMIN — IPRATROPIUM BROMIDE AND ALBUTEROL SULFATE 3 ML: .5; 3 SOLUTION RESPIRATORY (INHALATION) at 19:43

## 2021-11-21 RX ADMIN — AZITHROMYCIN MONOHYDRATE 500 MG: 500 INJECTION, POWDER, LYOPHILIZED, FOR SOLUTION INTRAVENOUS at 03:13

## 2021-11-21 RX ADMIN — ENOXAPARIN SODIUM 40 MG: 40 INJECTION SUBCUTANEOUS at 08:46

## 2021-11-21 RX ADMIN — IPRATROPIUM BROMIDE AND ALBUTEROL SULFATE 3 ML: .5; 3 SOLUTION RESPIRATORY (INHALATION) at 00:59

## 2021-11-21 RX ADMIN — IPRATROPIUM BROMIDE AND ALBUTEROL SULFATE 3 ML: .5; 3 SOLUTION RESPIRATORY (INHALATION) at 07:29

## 2021-11-21 RX ADMIN — Medication 1 CAPSULE: at 08:47

## 2021-11-21 RX ADMIN — METHYLPREDNISOLONE SODIUM SUCCINATE 40 MG: 40 INJECTION, POWDER, FOR SOLUTION INTRAMUSCULAR; INTRAVENOUS at 08:46

## 2021-11-21 RX ADMIN — METHADONE HYDROCHLORIDE 65 MG: 10 TABLET ORAL at 08:47

## 2021-11-21 RX ADMIN — Medication 10 MG: at 21:32

## 2021-11-21 RX ADMIN — SODIUM CHLORIDE, PRESERVATIVE FREE 10 ML: 5 INJECTION INTRAVENOUS at 08:47

## 2021-11-21 NOTE — PROGRESS NOTES
"    [facility] HOSPITALIST PROGRESS NOTE     Patient Identification:  Name:  Jessica A Collett  Age:  36 y.o.  Sex:  female  :  1985  MRN:  [id number]  Visit Number:  [id number]  ROOM: [address] [place] [address]     Primary Care Provider:  Lydia Bhagat APRN    Length of stay in inpatient status:  1    Subjective     Chief Compliant:    Chief Complaint   Patient presents with   • Shortness of Breath       History of Presenting Illness: Patient seen and evaluated in follow-up for severe sepsis secondary to community-acquired pneumonia with possible/likely exacerbation of underlying asthma with acute hypoxemic respiratory failure.  At time of examination this morning she remains on 2 L nasal cannula however is breathing more comfortably.  She does get short of breath and had a desaturation this morning when she ambulated to the restroom off oxygen.  She otherwise though states that she feels overall better and her leukocytosis has improved today.    Objective     Current Hospital Meds:cefTRIAXone, 2 g, Intravenous, Q24H   And  azithromycin, 500 mg, Intravenous, Q24H  enoxaparin, 40 mg, Subcutaneous, Q24H  hydroCHLOROthiazide, 25 mg, Oral, Daily  ipratropium-albuterol, 3 mL, Nebulization, 4x Daily - RT  iron polysaccharides, 150 mg, Oral, Daily  lactobacillus acidophilus, 1 capsule, Oral, Daily  lidocaine, 2 patch, Transdermal, Q24H  melatonin, 10 mg, Oral, Nightly  meloxicam, 15 mg, Oral, Daily  methadone, 65 mg, Oral, Daily  methylPREDNISolone sodium succinate, 40 mg, Intravenous, Q12H  nicotine, 1 patch, Transdermal, Q24H  pantoprazole, 40 mg, Oral, Q AM  sodium chloride, 10 mL, Intravenous, Q12H         Current Antimicrobial Therapy:  Anti-Infectives (From admission, onward)    Ordered     Dose/Rate Route Frequency Start Stop    21 0204  cefTRIAXone (ROCEPHIN) 2 g in sodium chloride 0.9 % 100 mL IVPB-VTB        Ordering Provider: Renu Fischer, DO   \"And\" Linked Group Details    2 g  200 mL/hr over " "30 Minutes Intravenous Every 24 Hours 11/21/21 0000 11/27/21 2359    11/20/21 0204  azithromycin 500 MG/250 ML 0.9% NS IVPB (MBP)        Ordering Provider: Renu Fischer DO   \"And\" Linked Group Details    500 mg  over 60 Minutes Intravenous Every 24 Hours 11/20/21 0204 11/25/21 0259    11/19/21 2325  cefTRIAXone (ROCEPHIN) 2 g in sodium chloride 0.9 % 100 mL IVPB-VTB        Ordering Provider: Jovanna Cárdenas APRN    2 g  200 mL/hr over 30 Minutes Intravenous Once 11/19/21 2327 11/20/21 0054    11/19/21 2325  doxycycline (VIBRAMYCIN) 100 mg in sodium chloride 0.9 % 100 mL IVPB-VTB        Ordering Provider: Jovanna Cárdenas APRN    100 mg Intravenous Once 11/19/21 2327 11/20/21 0055        Current Diuretic Therapy:  Diuretics (From admission, onward)    Ordered     Dose/Rate Route Frequency Start Stop    11/20/21 1626  hydroCHLOROthiazide (HYDRODIURIL) tablet 25 mg        Ordering Provider: Eleno Oconnor DO    25 mg Oral Daily 11/20/21 1800          ----------------------------------------------------------------------------------------------------------------------  Vital Signs:  Temp:  [98.1 °F (36.7 °C)-99.1 °F (37.3 °C)] 98.2 °F (36.8 °C)  Heart Rate:  [59-85] 72  Resp:  [16-20] 16  BP: ()/(56-72) 99/56  SpO2:  [94 %-99 %] 97 %  on  Flow (L/min):  [2] 2;   Device (Oxygen Therapy): nasal cannula  Body mass index is 39.97 kg/m².    Wt Readings from Last 3 Encounters:   11/20/21 126 kg (278 lb 9.6 oz)     Intake & Output (last 3 days)       11/18 0701 11/19 0700 11/19 0701 11/20 0700 11/20 0701 11/21 0700 11/21 0701 11/22 0700    P.O.  240 930 840    I.V. (mL/kg)   1112 (8.8)     Total Intake(mL/kg)  240 (1.9) 2042 (16.2) 840 (6.7)    Urine (mL/kg/hr)  500      Total Output  500      Net  -260 +2042 +840            Urine Unmeasured Occurrence   6 x 3 x        Diet Regular  ----------------------------------------------------------------------------------------------------------------------  Physical " exam:  Constitutional:  Well-developed and well-nourished.  No acute distress.  Resting comfortably in bed at time of exam.   HENT:  Head:  Normocephalic and atraumatic.  Mouth:  Moist mucous membranes.    Eyes:  Conjunctivae and EOM are normal. No scleral icterus.    Cardiovascular:  Normal rate, regular rhythm and normal heart sounds with no murmur.  Pulmonary/Chest:  No respiratory distress, faint intermittent wheezing improved from day prior, no further rhonchi present.  No crackles, with normal breath sounds and good air movement.  Abdominal: Obese, soft.  Bowel sounds are normal.  No distension and no tenderness.   Musculoskeletal:  No edema, no tenderness, and no deformity.  No red or swollen joints anywhere.   Neurological:  Alert and oriented to person, place, and time.  No cranial nerve deficit.    Skin:  Skin is warm and dry. No rash or lesion noted. No pallor.   Peripheral vascular:  Pulses in all 4 extremities with no clubbing, no cyanosis, no edema.  Psychiatric: Appropriate mood and affect, pleasant.   ----------------------------------------------------------------------------------------------------------------------  Tele:    ----------------------------------------------------------------------------------------------------------------------  Results from last 7 days   Lab Units 11/21/21  0554 11/21/21  0055 11/20/21  1743 11/20/21  1240 11/20/21  0356 11/20/21  0135 11/19/21  2241   CRP mg/dL  --   --   --   --  10.78*  --  11.80*   LACTATE mmol/L 1.9 1.6 1.7   < > 1.5   < > 2.4*   WBC 10*3/mm3  --  24.17*  --   --  25.72*  --  32.89*   HEMOGLOBIN g/dL  --  10.0*  --   --  10.5*  --  11.5*   HEMATOCRIT %  --  34.8  --   --  34.9  --  38.5   MCV fL  --  71.3*  --   --  68.8*  --  69.6*   MCHC g/dL  --  28.7*  --   --  30.1*  --  29.9*   PLATELETS 10*3/mm3  --  405  --   --  431  --  497*    < > = values in this interval not displayed.     Results from last 7 days   Lab Units 11/19/21 2244   PH,  ARTERIAL pH units 7.498*   PO2 ART mm Hg 53.2*   PCO2, ARTERIAL mm Hg 32.9*   HCO3 ART mmol/L 25.6     Results from last 7 days   Lab Units 11/21/21  0055 11/20/21  0356 11/19/21 2241   SODIUM mmol/L 140 139 142   POTASSIUM mmol/L 4.4 4.4 3.9   CHLORIDE mmol/L 107 104 102   CO2 mmol/L 22.3 23.4 25.8   BUN mg/dL 20 15 15   CREATININE mg/dL 0.66 0.61 0.64   EGFR IF NONAFRICN AM mL/min/1.73 102 112 106   CALCIUM mg/dL 8.3* 8.8 9.5   GLUCOSE mg/dL 135* 113* 98   ALBUMIN g/dL  --  3.81 4.34   BILIRUBIN mg/dL  --  0.3 0.3   ALK PHOS U/L  --  80 80   AST (SGOT) U/L  --  31 30   ALT (SGPT) U/L  --  17 17   Estimated Creatinine Clearance: 171.9 mL/min (by C-G formula based on SCr of 0.66 mg/dL).  No results found for: AMMONIA  Results from last 7 days   Lab Units 11/19/21 2241   TROPONIN T ng/mL <0.010             Hemoglobin A1C   Date/Time Value Ref Range Status   11/20/2021 0356 5.30 4.80 - 5.60 % Final     Lab Results   Component Value Date    TSH 0.778 11/20/2021    FREET4 1.20 11/20/2021     Lab Results   Component Value Date    PREGTESTUR Negative 11/19/2021     Pain Management Panel     Pain Management Panel Latest Ref Rng & Units 11/19/2021    AMPHETAMINES SCREEN, URINE Negative Negative    BARBITURATES SCREEN Negative Negative    BENZODIAZEPINE SCREEN, URINE Negative Negative    BUPRENORPHINEUR Negative Negative    COCAINE SCREEN, URINE Negative Negative    METHADONE SCREEN, URINE Negative Positive(A)    METHAMPHETAMINEUR Negative Negative        Brief Urine Lab Results  (Last result in the past 365 days)      Color   Clarity   Blood   Leuk Est   Nitrite   Protein   CREAT   Urine HCG        11/19/21 2241               Negative       11/19/21 2241 Yellow   Clear   Negative   Negative   Negative   Trace               No results found for: BLOODCX  No results found for: URINECX  No results found for: WOUNDCX  No results found for: STOOLCX  No results found for: RESPCX  No results found for: AFBCX  Results from last 7  days   Lab Units 11/21/21  0554 11/21/21  0055 11/20/21  1743 11/20/21  1240 11/20/21  0356 11/20/21  0135 11/19/21  2241   PROCALCITONIN ng/mL  --   --   --   --   --   --  0.04   LACTATE mmol/L 1.9 1.6 1.7 1.9 1.5 1.3 2.4*   CRP mg/dL  --   --   --   --  10.78*  --  11.80*       I have personally looked at the labs and they are summarized above.  ----------------------------------------------------------------------------------------------------------------------  Detailed radiology reports for the last 24 hours:    Imaging Results (Last 24 Hours)     ** No results found for the last 24 hours. **        Assessment & Plan      Severe sepsis  Community-acquired pneumonia  Acute exacerbation of asthma  Acute hypoxemic respiratory failure    -Patient with diffuse multifocal bilateral pneumonia on imaging.  Initial ABG with a PO2 of 53 and an O2 saturation of 89% on room air.    -Continue Rocephin and azithromycin for empiric treatment of community-acquired pneumonia.  Noted for QTC prolongation with azithromycin and methadone combination.  EKG ordered for tomorrow morning.    -MRSA nasal swab negative, strep pneumo antigen negative, respiratory panel also negative.  Sputum culture collected and pending at this time.    -Given concern for acute exacerbation of asthma we will continue with steroids with Solu-Medrol 40 mg but will decrease to daily from every 12.    -Incentive spirometry    -Scheduled breathing treatments    Elevated D-dimer  Intermittent swelling of left lower extremity    -CT with PE protocol negative and duplex of lower extremity negative as well.    -TTE ordered and shows EF of 66 to 70% with normal LV function and no diastolic dysfunction with normal cardiac chambers.    Microcytic anemia  Thrombocytosis    -Likely representative of her acute infectious process at this time    -B12, folate, iron panel all ordered and pending    -Peripheral blood smear also ordered and pending at this time    Street  of substance abuse  Chronic methadone    -Patient prescribed methadone dosage confirmed and continued    Smoking tobacco usage    -Nicotine replacement therapy, cessation counseled    Chronic back pain  Arthritis    -Supportive care    Morbid obesity    -Complicates all aspects of care    VTE Prophylaxis:   Mechanical Order History:     None      Pharmalogical Order History:      Ordered     Dose Route Frequency Stop    11/20/21 0204  enoxaparin (LOVENOX) syringe 40 mg         40 mg SC Every 24 Hours --                Disposition Home once medically stable and improved.    Eleno Oconnor DO  [facility] Hospitalist  11/21/21  15:21 EST

## 2021-11-21 NOTE — PLAN OF CARE
Goal Outcome Evaluation:           Progress: improving  Outcome Summary: Pt resting in bed during assessment. Pt says that she is feeling some better. Vital signs stable, no acute changes at this time. Will continue to monitor.

## 2021-11-21 NOTE — PLAN OF CARE
Goal Outcome Evaluation:  Plan of Care Reviewed With: patient      Progress: improving  Outcome Summary: Pt reports that she feels some better today, ambulating on RA to bathroom saturations drop to mid 80's (per pt). Pt still on 2 L NC at this time. No other issues/complaints noted.

## 2021-11-22 VITALS
HEART RATE: 77 BPM | BODY MASS INDEX: 39.88 KG/M2 | DIASTOLIC BLOOD PRESSURE: 77 MMHG | SYSTOLIC BLOOD PRESSURE: 134 MMHG | TEMPERATURE: 98 F | OXYGEN SATURATION: 97 % | HEIGHT: 70 IN | WEIGHT: 278.6 LBS | RESPIRATION RATE: 18 BRPM

## 2021-11-22 LAB
ANION GAP SERPL CALCULATED.3IONS-SCNC: 11.7 MMOL/L (ref 5–15)
ANISOCYTOSIS BLD QL: NORMAL
BACTERIA SPEC RESP CULT: NORMAL
BASOPHILS # BLD AUTO: 0.03 10*3/MM3 (ref 0–0.2)
BASOPHILS NFR BLD AUTO: 0.2 % (ref 0–1.5)
BUN SERPL-MCNC: 14 MG/DL (ref 6–20)
BUN/CREAT SERPL: 23 (ref 7–25)
CALCIUM SPEC-SCNC: 8.9 MG/DL (ref 8.6–10.5)
CHLORIDE SERPL-SCNC: 104 MMOL/L (ref 98–107)
CO2 SERPL-SCNC: 23.3 MMOL/L (ref 22–29)
CREAT SERPL-MCNC: 0.61 MG/DL (ref 0.57–1)
CRP SERPL-MCNC: 4.85 MG/DL (ref 0–0.5)
DEPRECATED RDW RBC AUTO: 50.2 FL (ref 37–54)
EOSINOPHIL # BLD AUTO: 0.03 10*3/MM3 (ref 0–0.4)
EOSINOPHIL NFR BLD AUTO: 0.2 % (ref 0.3–6.2)
ERYTHROCYTE [DISTWIDTH] IN BLOOD BY AUTOMATED COUNT: 19.9 % (ref 12.3–15.4)
GFR SERPL CREATININE-BSD FRML MDRD: 112 ML/MIN/1.73
GLUCOSE SERPL-MCNC: 139 MG/DL (ref 65–99)
GRAM STN SPEC: NORMAL
HCT VFR BLD AUTO: 35.1 % (ref 34–46.6)
HGB BLD-MCNC: 10.2 G/DL (ref 12–15.9)
HYPOCHROMIA BLD QL: NORMAL
IMM GRANULOCYTES # BLD AUTO: 0.14 10*3/MM3 (ref 0–0.05)
IMM GRANULOCYTES NFR BLD AUTO: 0.8 % (ref 0–0.5)
LYMPHOCYTES # BLD AUTO: 2.36 10*3/MM3 (ref 0.7–3.1)
LYMPHOCYTES NFR BLD AUTO: 13.7 % (ref 19.6–45.3)
MCH RBC QN AUTO: 20.6 PG (ref 26.6–33)
MCHC RBC AUTO-ENTMCNC: 29.1 G/DL (ref 31.5–35.7)
MCV RBC AUTO: 70.9 FL (ref 79–97)
MICROCYTES BLD QL: NORMAL
MONOCYTES # BLD AUTO: 0.63 10*3/MM3 (ref 0.1–0.9)
MONOCYTES NFR BLD AUTO: 3.7 % (ref 5–12)
NEUTROPHILS NFR BLD AUTO: 14.04 10*3/MM3 (ref 1.7–7)
NEUTROPHILS NFR BLD AUTO: 81.4 % (ref 42.7–76)
NRBC BLD AUTO-RTO: 0 /100 WBC (ref 0–0.2)
OVALOCYTES BLD QL SMEAR: NORMAL
PLAT MORPH BLD: NORMAL
PLATELET # BLD AUTO: 426 10*3/MM3 (ref 140–450)
PMV BLD AUTO: 10.4 FL (ref 6–12)
POTASSIUM SERPL-SCNC: 4.3 MMOL/L (ref 3.5–5.2)
QT INTERVAL: 346 MS
QT INTERVAL: 414 MS
QTC INTERVAL: 414 MS
QTC INTERVAL: 418 MS
RBC # BLD AUTO: 4.95 10*6/MM3 (ref 3.77–5.28)
SODIUM SERPL-SCNC: 139 MMOL/L (ref 136–145)
WBC NRBC COR # BLD: 17.23 10*3/MM3 (ref 3.4–10.8)

## 2021-11-22 PROCEDURE — 85025 COMPLETE CBC W/AUTO DIFF WBC: CPT | Performed by: STUDENT IN AN ORGANIZED HEALTH CARE EDUCATION/TRAINING PROGRAM

## 2021-11-22 PROCEDURE — 85007 BL SMEAR W/DIFF WBC COUNT: CPT | Performed by: STUDENT IN AN ORGANIZED HEALTH CARE EDUCATION/TRAINING PROGRAM

## 2021-11-22 PROCEDURE — 86140 C-REACTIVE PROTEIN: CPT | Performed by: INTERNAL MEDICINE

## 2021-11-22 PROCEDURE — 94799 UNLISTED PULMONARY SVC/PX: CPT

## 2021-11-22 PROCEDURE — 25010000002 AZITHROMYCIN PER 500 MG: Performed by: INTERNAL MEDICINE

## 2021-11-22 PROCEDURE — 99239 HOSP IP/OBS DSCHRG MGMT >30: CPT | Performed by: INTERNAL MEDICINE

## 2021-11-22 PROCEDURE — 25010000002 ENOXAPARIN PER 10 MG: Performed by: INTERNAL MEDICINE

## 2021-11-22 PROCEDURE — 93005 ELECTROCARDIOGRAM TRACING: CPT | Performed by: STUDENT IN AN ORGANIZED HEALTH CARE EDUCATION/TRAINING PROGRAM

## 2021-11-22 PROCEDURE — 80048 BASIC METABOLIC PNL TOTAL CA: CPT | Performed by: STUDENT IN AN ORGANIZED HEALTH CARE EDUCATION/TRAINING PROGRAM

## 2021-11-22 RX ORDER — IRON POLYSACCHARIDE COMPLEX 150 MG
150 CAPSULE ORAL DAILY
Qty: 30 CAPSULE | Refills: 0 | Status: SHIPPED | OUTPATIENT
Start: 2021-11-23 | End: 2022-01-15

## 2021-11-22 RX ORDER — BENZONATATE 100 MG/1
100 CAPSULE ORAL 3 TIMES DAILY PRN
Qty: 14 CAPSULE | Refills: 0 | Status: SHIPPED | OUTPATIENT
Start: 2021-11-22 | End: 2022-01-15

## 2021-11-22 RX ORDER — NICOTINE 21 MG/24HR
1 PATCH, TRANSDERMAL 24 HOURS TRANSDERMAL EVERY 24 HOURS
Qty: 14 PATCH | Refills: 0 | Status: SHIPPED | OUTPATIENT
Start: 2021-11-23 | End: 2021-12-07

## 2021-11-22 RX ORDER — PREDNISONE 20 MG/1
40 TABLET ORAL DAILY
Qty: 12 TABLET | Refills: 0 | Status: SHIPPED | OUTPATIENT
Start: 2021-11-22 | End: 2021-11-28

## 2021-11-22 RX ORDER — CEFDINIR 300 MG/1
300 CAPSULE ORAL 2 TIMES DAILY
Qty: 8 CAPSULE | Refills: 0 | Status: SHIPPED | OUTPATIENT
Start: 2021-11-22 | End: 2021-11-26

## 2021-11-22 RX ORDER — PANTOPRAZOLE SODIUM 40 MG/1
40 TABLET, DELAYED RELEASE ORAL
Qty: 30 TABLET | Refills: 0 | Status: SHIPPED | OUTPATIENT
Start: 2021-11-23 | End: 2022-01-15

## 2021-11-22 RX ADMIN — AZITHROMYCIN MONOHYDRATE 500 MG: 500 INJECTION, POWDER, LYOPHILIZED, FOR SOLUTION INTRAVENOUS at 03:24

## 2021-11-22 RX ADMIN — SODIUM CHLORIDE, PRESERVATIVE FREE 10 ML: 5 INJECTION INTRAVENOUS at 09:32

## 2021-11-22 RX ADMIN — IPRATROPIUM BROMIDE AND ALBUTEROL SULFATE 3 ML: .5; 3 SOLUTION RESPIRATORY (INHALATION) at 01:02

## 2021-11-22 RX ADMIN — LIDOCAINE 2 PATCH: 50 PATCH CUTANEOUS at 08:09

## 2021-11-22 RX ADMIN — METHADONE HYDROCHLORIDE 65 MG: 10 TABLET ORAL at 08:09

## 2021-11-22 RX ADMIN — Medication 1 CAPSULE: at 08:09

## 2021-11-22 RX ADMIN — MELOXICAM 15 MG: 7.5 TABLET ORAL at 08:08

## 2021-11-22 RX ADMIN — ENOXAPARIN SODIUM 40 MG: 40 INJECTION SUBCUTANEOUS at 08:10

## 2021-11-22 RX ADMIN — IPRATROPIUM BROMIDE AND ALBUTEROL SULFATE 3 ML: .5; 3 SOLUTION RESPIRATORY (INHALATION) at 07:23

## 2021-11-22 RX ADMIN — HYDROCHLOROTHIAZIDE 25 MG: 25 TABLET ORAL at 08:09

## 2021-11-22 RX ADMIN — NICOTINE TRANSDERMAL SYSTEM 1 PATCH: 21 PATCH, EXTENDED RELEASE TRANSDERMAL at 08:10

## 2021-11-22 RX ADMIN — Medication 150 MG: at 08:09

## 2021-11-22 NOTE — DISCHARGE INSTR - APPOINTMENTS
You have a scheduled follow-up appointment with Lydia HERBERT on 11/29/21 at 10:00 am.  You can reach the office at .

## 2021-11-22 NOTE — CASE MANAGEMENT/SOCIAL WORK
Discharge Planning Assessment   Miah     Patient Name: Jessica A Collett  MRN: [id number]  Today's Date: 11/22/2021    Admit Date: 11/19/2021     Discharge Needs Assessment     Row Name 11/22/21 1204       Living Environment    Lives With significant other; child(rubin), dependent    Name(s) of Who Lives With Patient S/O Jose Palafox & her children Esther (15), Amita (12) & Minerva (3)    Unique Family Situation S/O & her Mother are caring for the children while she is in hospital    Current Living Arrangements home/apartment/condo    Primary Care Provided by self    Provides Primary Care For child(rubin)    Caregiving Concerns S/O & her Mother are caring for the children while she is in hospital    Family Caregiver if Needed significant other    Family Caregiver Names Jose can help if needed    Quality of Family Relationships helpful; involved; supportive    Able to Return to Prior Arrangements yes       Resource/Environmental Concerns    Resource/Environmental Concerns none    Transportation Concerns car, none       Transition Planning    Patient/Family Anticipates Transition to home with family    Patient/Family Anticipated Services at Transition none    Transportation Anticipated car, drives self; family or friend will provide       Discharge Needs Assessment    Readmission Within the Last 30 Days no previous admission in last 30 days    Equipment Currently Used at Home nebulizer  Son's Nebulizer    Concerns to be Addressed no discharge needs identified; denies needs/concerns at this time    Anticipated Changes Related to Illness none    Equipment Needed After Discharge none               Discharge Plan     Row Name 11/22/21 5263       Plan    Plan Patient is an independent unemployed female who lives at home with her S/O & children, where she plans to return at discharge.  Patient's S/O & her mother are caring for the children while she is in the hospital.  Patient's PCP is Yvette Bhagat & she uses [facility] in [place] for her prescription needs.  Patient denies any insurance or financial issues at this time.  Patient does not utilize Home Health & denies the need at this time.  Patient does not have any DME however, she uses her son's nebulizer fron an unknown provider with the medication that her PCP gave her maya she was diagnosed with PNA & FLU.  Patient states she will be driving herself home at discharge via private vehicle.  No issues or concerns are needed at this time.  CM will continue to follow and assist with any discharge needs.    Row Name 11/22/21 0833       Plan    Plan Comments 11/22:  IV Azithro, Rocephin, Solu-Medrol, PO Methadone, pO2 53.2, WBC 17.23, Lactate 2.4, D-Dimer 1.00, UDS + Methadone, CT CT Chest=B PNA, Had COVID 8/21, Diagnosed with PNA & Flu B in PCP office recently & prescribed PO Levaquin, steroids & shot of Rocephin 11/19, SOB worsening, Smokes 1 ppd X 15 yrs, past substance abuse, Sats drop to 80s when ambulating to BR on R.A, still SOB,  Home @ [facility]              Continued Care and Services - Admitted Since 11/19/2021    Coordination has not been started for this encounter.          Demographic Summary     Row Name 11/22/21 0833       General Information    Admission Type inpatient    Arrived From home; emergency department    Referral Source admission list; high risk screening    Reason for Consult discharge planning; other (see comments)  CM Trigger    Preferred Language English     Used During This Interaction no               Functional Status     Row Name 11/22/21 0846       Functional Status    Usual Activity Tolerance good    Current Activity Tolerance good       Functional Status, IADL    Medications independent    Meal Preparation independent    Housekeeping independent    Laundry independent    Shopping independent       Mental Status Summary    Recent Changes in Mental Status/Cognitive Functioning no changes       Employment/    Employment Status  unemployed    Current or Previous Occupation professional    Employment/ Comments            Current or Previous  Service none               Psychosocial    No documentation.                Abuse/Neglect    No documentation.                Legal    No documentation.                Substance Abuse    No documentation.                Patient Forms    No documentation.                   Geneva Xie RN

## 2021-11-22 NOTE — CASE MANAGEMENT/SOCIAL WORK
Case Management Discharge Note      Final Note: Patient being discharged home on this date 11/22/21.  No needs identfied.         Selected Continued Care - Admitted Since 11/19/2021         Final Discharge Disposition Code: 01 - home or self-care   

## 2021-11-22 NOTE — PLAN OF CARE
Goal Outcome Evaluation:           Progress: improving  Outcome Summary: Pt resting in bed during assessment. No complaints at this time. Vital signs stable, no acute changes at this time. Still on 2 LPM NC. Will continue to monitor.

## 2021-11-22 NOTE — DISCHARGE SUMMARY
[facility] HOSPITALISTS DISCHARGE SUMMARY    Patient Identification:  Name:  Jessica A Collett  Age:  36 y.o.  Sex:  female  :  1985  MRN:  [id number]  Visit Number:  [id number]    Date of Admission: 2021  Date of Discharge:  2021     PCP: Lydia Bhagat APRN    DISCHARGE DIAGNOSIS  Severe sepsis - Resolved  Acute Hypoxic Respiratory Failure - Resolved  PNA, Bacterial, treating for CAP - Improved  Asthma w/ acute exacerbation - Improved  Elevated D-dimer  Intermittent swelling of left lower extremity  Microcytic anemia  Thrombocytosis  Hx of substance abuse  Chronic methadone  Smoking tobacco usage  Chronic back pain  Arthritis  Morbid obesity    CONSULTS   None    PROCEDURES PERFORMED  None    HOSPITAL COURSE  36F Smoker, Morbid Obesity by BMI PMH substance abuse, that presented to the [facility] emergency department for evaluation of shortness of breath.    #Severe sepsis & Acute Hypoxic Respiratory Failure 2/2 PNA, Bacterial, treating for CAP in setting of likely underlying Asthma w/ acute exacerbation.  Patient presented with increased shortness of breath, wheezing and diffuse multifocal bilateral pneumonia noted on imaging.  Initial ABG with a PO2 of 53 and an O2 saturation of 89% on room air.  MRSA negative, strep pneumo negative, resp panel negative, covid/flu negative.  Sputum culture w/ few GPCs, GP bacilli, GN bacilli and pending though Bcx's NGTD.  WBC count 32K on admission though improved now to 17K, CRP 11 and downtrending.  Patient has received 3 days IV ceftriaxone, dose of Doxy and 3 days high dose Azithromycin.  Has completed sufficient Azithromycin dosing.  Will continue PO Cefdinir at home.  Has been on IV steroids at home 3-4 days, will continue PO prednisone x 6 additional days at home for asthma exacerbation.  Currently on room air but have asked RN to perform walk test and if needs home 02 will Rx. F/u PCP 1 week.       #Elevated  D-dimer  #Intermittent swelling of left lower extremity  CT with PE protocol negative and duplex of lower extremity negative as well. TTE ordered and shows EF of 66 to 70% with normal LV function and no diastolic dysfunction with normal cardiac chambers.  Nothing further to do.      #Microcytic anemia  #Thrombocytosis  Likely representative of her acute infectious process at this time.  Iron 13 though, peripheral smear previously ordered and pending, continued new oral iron replacement, f/u PCP for further workup and treatment.     #Hx of substance abuse  #Chronic methadone  Patient prescribed methadone, dosage confirmed and continued on discharge     #Smoking tobacco usage  Nicotine replacement therapy ordered at discharge, cessation counseled     #Chronic back pain  #Arthritis  Supportive care     #Morbid obesity  Complicates all aspects of care    VITAL SIGNS:  Temp:  [98.1 °F (36.7 °C)-98.9 °F (37.2 °C)] 98.1 °F (36.7 °C)  Heart Rate:  [61-88] 61  Resp:  [16-18] 18  BP: ()/(56-86) 132/86  SpO2:  [94 %-99 %] 96 %  on  Flow (L/min):  [2] 2;   Device (Oxygen Therapy): room air    Body mass index is 39.97 kg/m².  Wt Readings from Last 3 Encounters:   11/20/21 126 kg (278 lb 9.6 oz)     PHYSICAL EXAM:  Constitutional:  Well-developed and well-nourished.  No respiratory distress.      HENT:  Head:  Normocephalic and atraumatic.  Mouth:  Moist mucous membranes.    Eyes:  Conjunctivae and EOM are normal.  No scleral icterus.    Neck:  Neck supple.  No JVD present.    Cardiovascular:  Normal rate, regular rhythm and normal heart sounds with no murmur.  Pulmonary/Chest:  No respiratory distress, no wheezes, on room air  Abdominal:  Soft. No distension and no tenderness.   Musculoskeletal:  No tenderness, and no deformity.  No red or swollen joints anywhere.    Neurological:  Alert and oriented to person, place, and time.  No cranial nerve deficit.    Skin:  Skin is warm and dry. No rash noted. No pallor.    Peripheral vascular: no clubbing, no cyanosis, no edema.    DISCHARGE DISPOSITION   Stable    DISCHARGE MEDICATIONS:     Discharge Medications      New Medications      Instructions Start Date   benzonatate 100 MG capsule  Commonly known as: TESSALON   100 mg, Oral, 3 Times Daily PRN      cefdinir 300 MG capsule  Commonly known as: OMNICEF   300 mg, Oral, 2 Times Daily      iron polysaccharides 150 MG capsule  Commonly known as: NIFEREX   150 mg, Oral, Daily   Start Date: November 23, 2021     nicotine 21 MG/24HR patch  Commonly known as: NICODERM CQ   1 patch, Transdermal, Every 24 Hours   Start Date: November 23, 2021     pantoprazole 40 MG EC tablet  Commonly known as: PROTONIX   40 mg, Oral, Every Early Morning   Start Date: November 23, 2021     predniSONE 20 MG tablet  Commonly known as: DELTASONE  Replaces: predniSONE 5 MG (21) tablet therapy pack dose pack   40 mg, Oral, Daily         Continue These Medications      Instructions Start Date   albuterol sulfate  (90 Base) MCG/ACT inhaler  Commonly known as: PROVENTIL HFA;VENTOLIN HFA;PROAIR HFA   2 puffs, Inhalation, Every 6 Hours PRN      albuterol 1.25 MG/3ML nebulizer solution  Commonly known as: ACCUNEB   1 ampule, Nebulization, Every 6 Hours PRN      hydroCHLOROthiazide 25 MG tablet  Commonly known as: HYDRODIURIL   25 mg, Oral, Daily      meloxicam 15 MG tablet  Commonly known as: MOBIC   15 mg, Oral, Daily      methadone 10 MG tablet  Commonly known as: DOLOPHINE   65 mg, Oral, Daily         Stop These Medications    levoFLOXacin 500 MG tablet  Commonly known as: LEVAQUIN     predniSONE 5 MG (21) tablet therapy pack dose pack  Replaced by: predniSONE 20 MG tablet            Activity Instructions     Activity as Tolerated          Additional Instructions for the Follow-ups that You Need to Schedule     Discharge Follow-up with PCP   As directed       Currently Documented PCP:    Lydia Bhagat APRN    PCP Phone Number:    [phone]     Follow  Up Details: 1 week post hospital discharge for PNA, also f/u for iron deficiency anemia            Follow-up Information     Lydia Bhagat APRN .    Specialty: Nurse Practitioner  Why: 1 week post hospital discharge for PNA, also f/u for iron deficiency anemia  Contact information:  [address] [place] [address]  [place] [address] [phone]                        TEST  RESULTS PENDING AT DISCHARGE  Pending Labs     Order Current Status    Peripheral Blood Smear In process    Blood Culture - Blood, Arm, Left Preliminary result    Blood Culture - Blood, Arm, Right Preliminary result    Respiratory Culture - Sputum, Cough Preliminary result        CODE STATUS  Code Status and Medical Interventions:   Ordered at: 11/20/21 0131     Code Status (Patient has no pulse and is not breathing):    CPR (Attempt to Resuscitate)     Medical Interventions (Patient has pulse or is breathing):    Full Support     Gold Ballesteros MD  [facility]  11/22/21  09:14 EST    Please note that this discharge summary required more than 30 minutes to complete.

## 2021-11-23 LAB
CYTOLOGIST CVX/VAG CYTO: NORMAL
PATH INTERP BLD-IMP: NORMAL

## 2021-11-23 NOTE — PAYOR COMM NOTE
"CONTACT:   Lisha Escobar RN  Phone: [phone]  Fax: [phone]    DISCHARGE NOTIFICATION- still pending approval     DISCHARGE TO: home    REF # [id number]  DC DATE: 21    IF YOU NEED ANYTHING FURTHER PLEASE LET ME KNOW.   THANKS     Collett, Jessica A (36 y.o. Female)             Date of Birth Social Security Number Address Home Phone MRN    1985  [address]   [place] [address] [phone] [id number]    Sikh Marital Status             None Single       Admission Date Admission Type Admitting Provider Attending Provider Department, Room/Bed    21 Emergency Renu Fischer DO  [address] 3349/2S    Discharge Date Discharge Disposition Discharge Destination          2021 Home or Self Care              Attending Provider: (none)   Allergies: Bactrim [Sulfamethoxazole-trimethoprim]    Isolation: None   Infection: None   Code Status: Prior   Advance Care Planning Activity    Ht: 177.8 cm (70\")   Wt: 126 kg (278 lb 9.6 oz)    Admission Cmt: None   Principal Problem: Sepsis due to pneumonia (HCC) [J18.9,A41.9]                 Active Insurance as of 2021     Primary Coverage     Payor Plan Insurance Group Employer/Plan Group    [employer] [place] [employer] [place]ICA[place]      Payor Plan Address Payor Plan Phone Number Payor Plan Fax Number Effective Dates    [address] [address] [phone]  2021 - None Entered    [facility] [id number]       Subscriber Name Subscriber Birth Date Member ID       COLLETT,JESSICA A 1985 [id number]                 Emergency Contacts      (Rel.) Home Phone Work Phone Mobile Phone    DANNY VALLADARES (Significant Other) [phone] -- --    PaulAmita (Mother) -- -- [phone]               Discharge Summary      Gold Ballesteros MD at 21 0914              [facility] HOSPITALISTS DISCHARGE SUMMARY    Patient Identification:  Name:  Jessica A Collett  Age:  36 y.o.  Sex:  female  :  " 1985  MRN:  [id number]  Visit Number:  [id number]    Date of Admission: 11/19/2021  Date of Discharge:  11/22/2021     PCP: Lydia Bhagat APRN    DISCHARGE DIAGNOSIS  Severe sepsis - Resolved  Acute Hypoxic Respiratory Failure - Resolved  PNA, Bacterial, treating for CAP - Improved  Asthma w/ acute exacerbation - Improved  Elevated D-dimer  Intermittent swelling of left lower extremity  Microcytic anemia  Thrombocytosis  Hx of substance abuse  Chronic methadone  Smoking tobacco usage  Chronic back pain  Arthritis  Morbid obesity    CONSULTS   None    PROCEDURES PERFORMED  None    HOSPITAL COURSE  36F Smoker, Morbid Obesity by BMI PMH substance abuse, that presented to the [facility] emergency department for evaluation of shortness of breath.    #Severe sepsis & Acute Hypoxic Respiratory Failure 2/2 PNA, Bacterial, treating for CAP in setting of likely underlying Asthma w/ acute exacerbation.  Patient presented with increased shortness of breath, wheezing and diffuse multifocal bilateral pneumonia noted on imaging.  Initial ABG with a PO2 of 53 and an O2 saturation of 89% on room air.  MRSA negative, strep pneumo negative, resp panel negative, covid/flu negative.  Sputum culture w/ few GPCs, GP bacilli, GN bacilli and pending though Bcx's NGTD.  WBC count 32K on admission though improved now to 17K, CRP 11 and downtrending.  Patient has received 3 days IV ceftriaxone, dose of Doxy and 3 days high dose Azithromycin.  Has completed sufficient Azithromycin dosing.  Will continue PO Cefdinir at home.  Has been on IV steroids at home 3-4 days, will continue PO prednisone x 6 additional days at home for asthma exacerbation.  Currently on room air but have asked RN to perform walk test and if needs home 02 will Rx. F/u PCP 1 week.       #Elevated D-dimer  #Intermittent swelling of left lower extremity  CT with PE protocol negative and duplex of lower extremity negative as well. TTE ordered and shows EF of  66 to 70% with normal LV function and no diastolic dysfunction with normal cardiac chambers.  Nothing further to do.      #Microcytic anemia  #Thrombocytosis  Likely representative of her acute infectious process at this time.  Iron 13 though, peripheral smear previously ordered and pending, continued new oral iron replacement, f/u PCP for further workup and treatment.     #Hx of substance abuse  #Chronic methadone  Patient prescribed methadone, dosage confirmed and continued on discharge     #Smoking tobacco usage  Nicotine replacement therapy ordered at discharge, cessation counseled     #Chronic back pain  #Arthritis  Supportive care     #Morbid obesity  Complicates all aspects of care    VITAL SIGNS:  Temp:  [98.1 °F (36.7 °C)-98.9 °F (37.2 °C)] 98.1 °F (36.7 °C)  Heart Rate:  [61-88] 61  Resp:  [16-18] 18  BP: ()/(56-86) 132/86  SpO2:  [94 %-99 %] 96 %  on  Flow (L/min):  [2] 2;   Device (Oxygen Therapy): room air    Body mass index is 39.97 kg/m².  Wt Readings from Last 3 Encounters:   11/20/21 126 kg (278 lb 9.6 oz)     PHYSICAL EXAM:  Constitutional:  Well-developed and well-nourished.  No respiratory distress.      HENT:  Head:  Normocephalic and atraumatic.  Mouth:  Moist mucous membranes.    Eyes:  Conjunctivae and EOM are normal.  No scleral icterus.    Neck:  Neck supple.  No JVD present.    Cardiovascular:  Normal rate, regular rhythm and normal heart sounds with no murmur.  Pulmonary/Chest:  No respiratory distress, no wheezes, on room air  Abdominal:  Soft. No distension and no tenderness.   Musculoskeletal:  No tenderness, and no deformity.  No red or swollen joints anywhere.    Neurological:  Alert and oriented to person, place, and time.  No cranial nerve deficit.    Skin:  Skin is warm and dry. No rash noted. No pallor.   Peripheral vascular: no clubbing, no cyanosis, no edema.    DISCHARGE DISPOSITION   Stable    DISCHARGE MEDICATIONS:     Discharge Medications      New Medications       Instructions Start Date   benzonatate 100 MG capsule  Commonly known as: TESSALON   100 mg, Oral, 3 Times Daily PRN      cefdinir 300 MG capsule  Commonly known as: OMNICEF   300 mg, Oral, 2 Times Daily      iron polysaccharides 150 MG capsule  Commonly known as: NIFEREX   150 mg, Oral, Daily   Start Date: November 23, 2021     nicotine 21 MG/24HR patch  Commonly known as: NICODERM CQ   1 patch, Transdermal, Every 24 Hours   Start Date: November 23, 2021     pantoprazole 40 MG EC tablet  Commonly known as: PROTONIX   40 mg, Oral, Every Early Morning   Start Date: November 23, 2021     predniSONE 20 MG tablet  Commonly known as: DELTASONE  Replaces: predniSONE 5 MG (21) tablet therapy pack dose pack   40 mg, Oral, Daily         Continue These Medications      Instructions Start Date   albuterol sulfate  (90 Base) MCG/ACT inhaler  Commonly known as: PROVENTIL HFA;VENTOLIN HFA;PROAIR HFA   2 puffs, Inhalation, Every 6 Hours PRN      albuterol 1.25 MG/3ML nebulizer solution  Commonly known as: ACCUNEB   1 ampule, Nebulization, Every 6 Hours PRN      hydroCHLOROthiazide 25 MG tablet  Commonly known as: HYDRODIURIL   25 mg, Oral, Daily      meloxicam 15 MG tablet  Commonly known as: MOBIC   15 mg, Oral, Daily      methadone 10 MG tablet  Commonly known as: DOLOPHINE   65 mg, Oral, Daily         Stop These Medications    levoFLOXacin 500 MG tablet  Commonly known as: LEVAQUIN     predniSONE 5 MG (21) tablet therapy pack dose pack  Replaced by: predniSONE 20 MG tablet            Activity Instructions     Activity as Tolerated          Additional Instructions for the Follow-ups that You Need to Schedule     Discharge Follow-up with PCP   As directed       Currently Documented PCP:    Lydia Bhagat APRN    PCP Phone Number:    [phone]     Follow Up Details: 1 week post hospital discharge for PNA, also f/u for iron deficiency anemia            Follow-up Information     Lydia Bhagat APRN .    Specialty: Nurse  Practitioner  Why: 1 week post hospital discharge for PNA, also f/u for iron deficiency anemia  Contact information:  [address] [place] [address] [place] [address] [phone]                        TEST  RESULTS PENDING AT DISCHARGE  Pending Labs     Order Current Status    Peripheral Blood Smear In process    Blood Culture - Blood, Arm, Left Preliminary result    Blood Culture - Blood, Arm, Right Preliminary result    Respiratory Culture - Sputum, Cough Preliminary result        CODE STATUS  Code Status and Medical Interventions:   Ordered at: 11/20/21 0131     Code Status (Patient has no pulse and is not breathing):    CPR (Attempt to Resuscitate)     Medical Interventions (Patient has pulse or is breathing):    Full Support     Gold Ballesteros MD  [facility] Hospitalist  11/22/21  09:14 EST    Please note that this discharge summary required more than 30 minutes to complete.      Electronically signed by Gold Ballesteros MD at 11/22/21 1221

## 2021-11-24 LAB
BACTERIA SPEC AEROBE CULT: NORMAL
BACTERIA SPEC AEROBE CULT: NORMAL

## 2022-01-15 ENCOUNTER — HOSPITAL ENCOUNTER (INPATIENT)
Facility: HOSPITAL | Age: 37
LOS: 4 days | Discharge: HOME | End: 2022-01-19
Attending: FAMILY MEDICINE | Admitting: STUDENT IN AN ORGANIZED HEALTH CARE EDUCATION/TRAINING PROGRAM

## 2022-01-15 ENCOUNTER — APPOINTMENT (OUTPATIENT)
Dept: GENERAL RADIOLOGY | Facility: HOSPITAL | Age: 37
End: 2022-01-15

## 2022-01-15 ENCOUNTER — APPOINTMENT (OUTPATIENT)
Dept: CT IMAGING | Facility: HOSPITAL | Age: 37
End: 2022-01-15

## 2022-01-15 DIAGNOSIS — A41.9 SEPSIS, DUE TO UNSPECIFIED ORGANISM, UNSPECIFIED WHETHER ACUTE ORGAN DYSFUNCTION PRESENT: ICD-10-CM

## 2022-01-15 DIAGNOSIS — J18.9 PNEUMONIA DUE TO INFECTIOUS ORGANISM, UNSPECIFIED LATERALITY, UNSPECIFIED PART OF LUNG: Primary | ICD-10-CM

## 2022-01-15 LAB
A-A DO2: 41.3 MMHG (ref 0–300)
ALBUMIN SERPL-MCNC: 4.15 G/DL (ref 3.5–5.2)
ALBUMIN/GLOB SERPL: 1.2 G/DL
ALP SERPL-CCNC: 73 U/L (ref 39–117)
ALT SERPL W P-5'-P-CCNC: 12 U/L (ref 1–33)
AMPHET+METHAMPHET UR QL: NEGATIVE
AMPHETAMINES UR QL: NEGATIVE
ANION GAP SERPL CALCULATED.3IONS-SCNC: 15.2 MMOL/L (ref 5–15)
ANISOCYTOSIS BLD QL: NORMAL
ARTERIAL PATENCY WRIST A: ABNORMAL
AST SERPL-CCNC: 23 U/L (ref 1–32)
ATMOSPHERIC PRESS: 728 MMHG
BARBITURATES UR QL SCN: NEGATIVE
BASE EXCESS BLDA CALC-SCNC: 4.2 MMOL/L (ref 0–2)
BASOPHILS # BLD AUTO: 0.03 10*3/MM3 (ref 0–0.2)
BASOPHILS NFR BLD AUTO: 0.1 % (ref 0–1.5)
BDY SITE: ABNORMAL
BENZODIAZ UR QL SCN: NEGATIVE
BILIRUB SERPL-MCNC: 0.4 MG/DL (ref 0–1.2)
BILIRUB UR QL STRIP: NEGATIVE
BODY TEMPERATURE: 0 C
BUN SERPL-MCNC: 15 MG/DL (ref 6–20)
BUN/CREAT SERPL: 19.7 (ref 7–25)
BUPRENORPHINE SERPL-MCNC: NEGATIVE NG/ML
CALCIUM SPEC-SCNC: 9.6 MG/DL (ref 8.6–10.5)
CANNABINOIDS SERPL QL: NEGATIVE
CHLORIDE SERPL-SCNC: 99 MMOL/L (ref 98–107)
CHOLEST SERPL-MCNC: 119 MG/DL (ref 0–200)
CLARITY UR: CLEAR
CO2 BLDA-SCNC: 28.8 MMOL/L (ref 22–33)
CO2 SERPL-SCNC: 22.8 MMOL/L (ref 22–29)
COCAINE UR QL: NEGATIVE
COHGB MFR BLD: 1.5 % (ref 0–5)
COLOR UR: YELLOW
CREAT SERPL-MCNC: 0.76 MG/DL (ref 0.57–1)
D DIMER PPP FEU-MCNC: 0.63 MCGFEU/ML (ref 0–0.5)
D-LACTATE SERPL-SCNC: 2 MMOL/L (ref 0.5–2)
DEPRECATED RDW RBC AUTO: 43 FL (ref 37–54)
EOSINOPHIL # BLD AUTO: 0.01 10*3/MM3 (ref 0–0.4)
EOSINOPHIL NFR BLD AUTO: 0 % (ref 0.3–6.2)
ERYTHROCYTE [DISTWIDTH] IN BLOOD BY AUTOMATED COUNT: 18.7 % (ref 12.3–15.4)
FLUAV RNA RESP QL NAA+PROBE: NOT DETECTED
FLUBV RNA RESP QL NAA+PROBE: NOT DETECTED
GFR SERPL CREATININE-BSD FRML MDRD: 87 ML/MIN/1.73
GLOBULIN UR ELPH-MCNC: 3.6 GM/DL
GLUCOSE SERPL-MCNC: 112 MG/DL (ref 65–99)
GLUCOSE UR STRIP-MCNC: NEGATIVE MG/DL
HBA1C MFR BLD: 5.4 % (ref 4.8–5.6)
HCG SERPL QL: NEGATIVE
HCO3 BLDA-SCNC: 27.7 MMOL/L (ref 20–26)
HCT VFR BLD AUTO: 38.9 % (ref 34–46.6)
HCT VFR BLD CALC: 33.3 % (ref 38–51)
HDLC SERPL-MCNC: 52 MG/DL (ref 40–60)
HGB BLD-MCNC: 12 G/DL (ref 12–15.9)
HGB BLDA-MCNC: 10.9 G/DL (ref 13.5–17.5)
HGB UR QL STRIP.AUTO: NEGATIVE
HOLD SPECIMEN: NORMAL
HYPOCHROMIA BLD QL: NORMAL
IMM GRANULOCYTES # BLD AUTO: 0.17 10*3/MM3 (ref 0–0.05)
IMM GRANULOCYTES NFR BLD AUTO: 0.6 % (ref 0–0.5)
INHALED O2 CONCENTRATION: 21 %
INR PPP: 1.06 (ref 0.9–1.1)
KETONES UR QL STRIP: NEGATIVE
L PNEUMO1 AG UR QL IA: NEGATIVE
LDLC SERPL CALC-MCNC: 52 MG/DL (ref 0–100)
LDLC/HDLC SERPL: 1.02 {RATIO}
LEUKOCYTE ESTERASE UR QL STRIP.AUTO: NEGATIVE
LYMPHOCYTES # BLD AUTO: 2.61 10*3/MM3 (ref 0.7–3.1)
LYMPHOCYTES NFR BLD AUTO: 9.8 % (ref 19.6–45.3)
Lab: ABNORMAL
MCH RBC QN AUTO: 21.1 PG (ref 26.6–33)
MCHC RBC AUTO-ENTMCNC: 30.8 G/DL (ref 31.5–35.7)
MCV RBC AUTO: 68.5 FL (ref 79–97)
METHADONE UR QL SCN: POSITIVE
METHGB BLD QL: 0 % (ref 0–3)
MICROCYTES BLD QL: NORMAL
MODALITY: ABNORMAL
MONOCYTES # BLD AUTO: 1.05 10*3/MM3 (ref 0.1–0.9)
MONOCYTES NFR BLD AUTO: 3.9 % (ref 5–12)
NEUTROPHILS NFR BLD AUTO: 22.73 10*3/MM3 (ref 1.7–7)
NEUTROPHILS NFR BLD AUTO: 85.6 % (ref 42.7–76)
NITRITE UR QL STRIP: NEGATIVE
NOTE: ABNORMAL
NOTIFIED BY: ABNORMAL
NOTIFIED WHO: ABNORMAL
NRBC BLD AUTO-RTO: 0 /100 WBC (ref 0–0.2)
NT-PROBNP SERPL-MCNC: 585.6 PG/ML (ref 0–450)
OPIATES UR QL: NEGATIVE
OXYCODONE UR QL SCN: NEGATIVE
OXYHGB MFR BLDV: 91.3 % (ref 94–99)
PCO2 BLDA: 36.2 MM HG (ref 35–45)
PCO2 TEMP ADJ BLD: ABNORMAL MM[HG]
PCP UR QL SCN: NEGATIVE
PH BLDA: 7.49 PH UNITS (ref 7.35–7.45)
PH UR STRIP.AUTO: 8 [PH] (ref 5–8)
PH, TEMP CORRECTED: ABNORMAL
PLAT MORPH BLD: NORMAL
PLATELET # BLD AUTO: 433 10*3/MM3 (ref 140–450)
PMV BLD AUTO: 10.2 FL (ref 6–12)
PO2 BLDA: 60.9 MM HG (ref 83–108)
PO2 TEMP ADJ BLD: ABNORMAL MM[HG]
POTASSIUM SERPL-SCNC: 3.8 MMOL/L (ref 3.5–5.2)
PROPOXYPH UR QL: NEGATIVE
PROT SERPL-MCNC: 7.7 G/DL (ref 6–8.5)
PROT UR QL STRIP: NEGATIVE
PROTHROMBIN TIME: 14.2 SECONDS (ref 12.8–14.5)
QT INTERVAL: 354 MS
QTC INTERVAL: 444 MS
RBC # BLD AUTO: 5.68 10*6/MM3 (ref 3.77–5.28)
SAO2 % BLDCOA: 92.6 % (ref 94–99)
SARS-COV-2 RNA RESP QL NAA+PROBE: NOT DETECTED
SODIUM SERPL-SCNC: 137 MMOL/L (ref 136–145)
SP GR UR STRIP: >1.03 (ref 1–1.03)
TRICYCLICS UR QL SCN: NEGATIVE
TRIGL SERPL-MCNC: 71 MG/DL (ref 0–150)
TROPONIN T SERPL-MCNC: <0.01 NG/ML (ref 0–0.03)
TROPONIN T SERPL-MCNC: <0.01 NG/ML (ref 0–0.03)
TSH SERPL DL<=0.05 MIU/L-ACNC: 0.66 UIU/ML (ref 0.27–4.2)
UROBILINOGEN UR QL STRIP: ABNORMAL
VENTILATOR MODE: ABNORMAL
VLDLC SERPL-MCNC: 15 MG/DL (ref 5–40)
WBC NRBC COR # BLD: 26.6 10*3/MM3 (ref 3.4–10.8)
WHOLE BLOOD HOLD SPECIMEN: NORMAL
WHOLE BLOOD HOLD SPECIMEN: NORMAL

## 2022-01-15 PROCEDURE — 84145 PROCALCITONIN (PCT): CPT | Performed by: STUDENT IN AN ORGANIZED HEALTH CARE EDUCATION/TRAINING PROGRAM

## 2022-01-15 PROCEDURE — 87899 AGENT NOS ASSAY W/OPTIC: CPT | Performed by: STUDENT IN AN ORGANIZED HEALTH CARE EDUCATION/TRAINING PROGRAM

## 2022-01-15 PROCEDURE — 87636 SARSCOV2 & INF A&B AMP PRB: CPT | Performed by: FAMILY MEDICINE

## 2022-01-15 PROCEDURE — 83036 HEMOGLOBIN GLYCOSYLATED A1C: CPT | Performed by: STUDENT IN AN ORGANIZED HEALTH CARE EDUCATION/TRAINING PROGRAM

## 2022-01-15 PROCEDURE — 71045 X-RAY EXAM CHEST 1 VIEW: CPT

## 2022-01-15 PROCEDURE — 80061 LIPID PANEL: CPT | Performed by: STUDENT IN AN ORGANIZED HEALTH CARE EDUCATION/TRAINING PROGRAM

## 2022-01-15 PROCEDURE — 71275 CT ANGIOGRAPHY CHEST: CPT

## 2022-01-15 PROCEDURE — 87040 BLOOD CULTURE FOR BACTERIA: CPT | Performed by: FAMILY MEDICINE

## 2022-01-15 PROCEDURE — 84703 CHORIONIC GONADOTROPIN ASSAY: CPT | Performed by: FAMILY MEDICINE

## 2022-01-15 PROCEDURE — 85610 PROTHROMBIN TIME: CPT | Performed by: FAMILY MEDICINE

## 2022-01-15 PROCEDURE — 84484 ASSAY OF TROPONIN QUANT: CPT | Performed by: FAMILY MEDICINE

## 2022-01-15 PROCEDURE — 94799 UNLISTED PULMONARY SVC/PX: CPT

## 2022-01-15 PROCEDURE — 86738 MYCOPLASMA ANTIBODY: CPT | Performed by: STUDENT IN AN ORGANIZED HEALTH CARE EDUCATION/TRAINING PROGRAM

## 2022-01-15 PROCEDURE — 84484 ASSAY OF TROPONIN QUANT: CPT | Performed by: STUDENT IN AN ORGANIZED HEALTH CARE EDUCATION/TRAINING PROGRAM

## 2022-01-15 PROCEDURE — 0 IOPAMIDOL PER 1 ML: Performed by: STUDENT IN AN ORGANIZED HEALTH CARE EDUCATION/TRAINING PROGRAM

## 2022-01-15 PROCEDURE — 93005 ELECTROCARDIOGRAM TRACING: CPT | Performed by: FAMILY MEDICINE

## 2022-01-15 PROCEDURE — 81003 URINALYSIS AUTO W/O SCOPE: CPT | Performed by: STUDENT IN AN ORGANIZED HEALTH CARE EDUCATION/TRAINING PROGRAM

## 2022-01-15 PROCEDURE — 82375 ASSAY CARBOXYHB QUANT: CPT

## 2022-01-15 PROCEDURE — 85379 FIBRIN DEGRADATION QUANT: CPT | Performed by: FAMILY MEDICINE

## 2022-01-15 PROCEDURE — 83050 HGB METHEMOGLOBIN QUAN: CPT

## 2022-01-15 PROCEDURE — 25010000002 CEFTRIAXONE PER 250 MG: Performed by: FAMILY MEDICINE

## 2022-01-15 PROCEDURE — 85007 BL SMEAR W/DIFF WBC COUNT: CPT | Performed by: STUDENT IN AN ORGANIZED HEALTH CARE EDUCATION/TRAINING PROGRAM

## 2022-01-15 PROCEDURE — 25010000002 FUROSEMIDE PER 20 MG: Performed by: STUDENT IN AN ORGANIZED HEALTH CARE EDUCATION/TRAINING PROGRAM

## 2022-01-15 PROCEDURE — 83605 ASSAY OF LACTIC ACID: CPT | Performed by: FAMILY MEDICINE

## 2022-01-15 PROCEDURE — 85007 BL SMEAR W/DIFF WBC COUNT: CPT | Performed by: FAMILY MEDICINE

## 2022-01-15 PROCEDURE — 25010000002 METHYLPREDNISOLONE PER 40 MG: Performed by: STUDENT IN AN ORGANIZED HEALTH CARE EDUCATION/TRAINING PROGRAM

## 2022-01-15 PROCEDURE — 83880 ASSAY OF NATRIURETIC PEPTIDE: CPT | Performed by: FAMILY MEDICINE

## 2022-01-15 PROCEDURE — 85060 BLOOD SMEAR INTERPRETATION: CPT | Performed by: STUDENT IN AN ORGANIZED HEALTH CARE EDUCATION/TRAINING PROGRAM

## 2022-01-15 PROCEDURE — 99284 EMERGENCY DEPT VISIT MOD MDM: CPT

## 2022-01-15 PROCEDURE — 25010000002 ENOXAPARIN PER 10 MG: Performed by: STUDENT IN AN ORGANIZED HEALTH CARE EDUCATION/TRAINING PROGRAM

## 2022-01-15 PROCEDURE — 80306 DRUG TEST PRSMV INSTRMNT: CPT | Performed by: STUDENT IN AN ORGANIZED HEALTH CARE EDUCATION/TRAINING PROGRAM

## 2022-01-15 PROCEDURE — 82805 BLOOD GASES W/O2 SATURATION: CPT

## 2022-01-15 PROCEDURE — 36600 WITHDRAWAL OF ARTERIAL BLOOD: CPT

## 2022-01-15 PROCEDURE — 80050 GENERAL HEALTH PANEL: CPT | Performed by: FAMILY MEDICINE

## 2022-01-15 PROCEDURE — 99223 1ST HOSP IP/OBS HIGH 75: CPT | Performed by: STUDENT IN AN ORGANIZED HEALTH CARE EDUCATION/TRAINING PROGRAM

## 2022-01-15 RX ORDER — SODIUM CHLORIDE 0.9 % (FLUSH) 0.9 %
10 SYRINGE (ML) INJECTION EVERY 12 HOURS SCHEDULED
Status: DISCONTINUED | OUTPATIENT
Start: 2022-01-15 | End: 2022-01-19 | Stop reason: HOSPADM

## 2022-01-15 RX ORDER — BISACODYL 10 MG
10 SUPPOSITORY, RECTAL RECTAL DAILY PRN
Status: DISCONTINUED | OUTPATIENT
Start: 2022-01-15 | End: 2022-01-19 | Stop reason: HOSPADM

## 2022-01-15 RX ORDER — AZITHROMYCIN 250 MG/1
250 TABLET, FILM COATED ORAL TAKE AS DIRECTED
Status: CANCELLED | OUTPATIENT
Start: 2022-01-12

## 2022-01-15 RX ORDER — FUROSEMIDE 10 MG/ML
40 INJECTION INTRAMUSCULAR; INTRAVENOUS ONCE
Status: COMPLETED | OUTPATIENT
Start: 2022-01-15 | End: 2022-01-15

## 2022-01-15 RX ORDER — DEXAMETHASONE 4 MG/1
4 TABLET ORAL 2 TIMES DAILY WITH MEALS
Status: CANCELLED | OUTPATIENT
Start: 2022-01-15

## 2022-01-15 RX ORDER — AMOXICILLIN 250 MG
2 CAPSULE ORAL 2 TIMES DAILY
Status: DISCONTINUED | OUTPATIENT
Start: 2022-01-15 | End: 2022-01-19 | Stop reason: HOSPADM

## 2022-01-15 RX ORDER — ACETAMINOPHEN 325 MG/1
650 TABLET ORAL EVERY 4 HOURS PRN
Status: DISCONTINUED | OUTPATIENT
Start: 2022-01-15 | End: 2022-01-19 | Stop reason: HOSPADM

## 2022-01-15 RX ORDER — IPRATROPIUM BROMIDE AND ALBUTEROL SULFATE 2.5; .5 MG/3ML; MG/3ML
3 SOLUTION RESPIRATORY (INHALATION)
Status: DISCONTINUED | OUTPATIENT
Start: 2022-01-15 | End: 2022-01-19 | Stop reason: HOSPADM

## 2022-01-15 RX ORDER — METHYLPREDNISOLONE SODIUM SUCCINATE 40 MG/ML
40 INJECTION, POWDER, LYOPHILIZED, FOR SOLUTION INTRAMUSCULAR; INTRAVENOUS EVERY 8 HOURS
Status: DISCONTINUED | OUTPATIENT
Start: 2022-01-15 | End: 2022-01-18

## 2022-01-15 RX ORDER — AZITHROMYCIN 250 MG/1
250 TABLET, FILM COATED ORAL TAKE AS DIRECTED
COMMUNITY
End: 2022-01-19 | Stop reason: HOSPADM

## 2022-01-15 RX ORDER — SODIUM CHLORIDE 0.9 % (FLUSH) 0.9 %
10 SYRINGE (ML) INJECTION AS NEEDED
Status: DISCONTINUED | OUTPATIENT
Start: 2022-01-15 | End: 2022-01-19 | Stop reason: HOSPADM

## 2022-01-15 RX ORDER — POLYETHYLENE GLYCOL 3350 17 G/17G
17 POWDER, FOR SOLUTION ORAL DAILY PRN
Status: DISCONTINUED | OUTPATIENT
Start: 2022-01-15 | End: 2022-01-19 | Stop reason: HOSPADM

## 2022-01-15 RX ORDER — NICOTINE 21 MG/24HR
1 PATCH, TRANSDERMAL 24 HOURS TRANSDERMAL EVERY 24 HOURS
COMMUNITY
End: 2022-01-19 | Stop reason: HOSPADM

## 2022-01-15 RX ORDER — ALBUTEROL SULFATE 1.25 MG/3ML
1 SOLUTION RESPIRATORY (INHALATION) EVERY 6 HOURS PRN
Status: CANCELLED | OUTPATIENT
Start: 2022-01-15

## 2022-01-15 RX ORDER — ALBUTEROL SULFATE 90 UG/1
2 AEROSOL, METERED RESPIRATORY (INHALATION) EVERY 6 HOURS PRN
Status: CANCELLED | OUTPATIENT
Start: 2022-01-15

## 2022-01-15 RX ORDER — NICOTINE 21 MG/24HR
1 PATCH, TRANSDERMAL 24 HOURS TRANSDERMAL EVERY 24 HOURS
Status: CANCELLED | OUTPATIENT
Start: 2022-01-15

## 2022-01-15 RX ORDER — GUAIFENESIN 600 MG/1
1200 TABLET, EXTENDED RELEASE ORAL EVERY 12 HOURS SCHEDULED
Status: DISCONTINUED | OUTPATIENT
Start: 2022-01-15 | End: 2022-01-19 | Stop reason: HOSPADM

## 2022-01-15 RX ORDER — BISACODYL 5 MG/1
5 TABLET, DELAYED RELEASE ORAL DAILY PRN
Status: DISCONTINUED | OUTPATIENT
Start: 2022-01-15 | End: 2022-01-19 | Stop reason: HOSPADM

## 2022-01-15 RX ORDER — ONDANSETRON 2 MG/ML
4 INJECTION INTRAMUSCULAR; INTRAVENOUS EVERY 6 HOURS PRN
Status: DISCONTINUED | OUTPATIENT
Start: 2022-01-15 | End: 2022-01-19 | Stop reason: HOSPADM

## 2022-01-15 RX ORDER — NITROGLYCERIN 0.4 MG/1
0.4 TABLET SUBLINGUAL
Status: DISCONTINUED | OUTPATIENT
Start: 2022-01-15 | End: 2022-01-19 | Stop reason: HOSPADM

## 2022-01-15 RX ORDER — DEXAMETHASONE 4 MG/1
4 TABLET ORAL 2 TIMES DAILY WITH MEALS
COMMUNITY
End: 2022-01-19 | Stop reason: HOSPADM

## 2022-01-15 RX ADMIN — DOCUSATE SODIUM 50 MG AND SENNOSIDES 8.6 MG 2 TABLET: 8.6; 5 TABLET, FILM COATED ORAL at 22:03

## 2022-01-15 RX ADMIN — ENOXAPARIN SODIUM 40 MG: 40 INJECTION SUBCUTANEOUS at 22:02

## 2022-01-15 RX ADMIN — METHYLPREDNISOLONE SODIUM SUCCINATE 40 MG: 40 INJECTION, POWDER, FOR SOLUTION INTRAMUSCULAR; INTRAVENOUS at 22:02

## 2022-01-15 RX ADMIN — DOXYCYCLINE 100 MG: 100 INJECTION, POWDER, LYOPHILIZED, FOR SOLUTION INTRAVENOUS at 18:16

## 2022-01-15 RX ADMIN — SODIUM CHLORIDE 1 G: 9 INJECTION, SOLUTION INTRAVENOUS at 18:09

## 2022-01-15 RX ADMIN — IPRATROPIUM BROMIDE AND ALBUTEROL SULFATE 3 ML: .5; 3 SOLUTION RESPIRATORY (INHALATION) at 22:17

## 2022-01-15 RX ADMIN — IOPAMIDOL 70 ML: 755 INJECTION, SOLUTION INTRAVENOUS at 18:03

## 2022-01-15 RX ADMIN — METHADONE HYDROCHLORIDE 65 MG: 10 TABLET ORAL at 22:02

## 2022-01-15 RX ADMIN — SODIUM CHLORIDE 1000 ML: 9 INJECTION, SOLUTION INTRAVENOUS at 18:09

## 2022-01-15 RX ADMIN — GUAIFENESIN 1200 MG: 600 TABLET, EXTENDED RELEASE ORAL at 22:01

## 2022-01-15 RX ADMIN — FUROSEMIDE 40 MG: 10 INJECTION, SOLUTION INTRAVENOUS at 22:01

## 2022-01-16 ENCOUNTER — APPOINTMENT (OUTPATIENT)
Dept: CARDIOLOGY | Facility: HOSPITAL | Age: 37
End: 2022-01-16

## 2022-01-16 LAB
ANION GAP SERPL CALCULATED.3IONS-SCNC: 12.3 MMOL/L (ref 5–15)
ANISOCYTOSIS BLD QL: NORMAL
BASOPHILS # BLD AUTO: 0.02 10*3/MM3 (ref 0–0.2)
BASOPHILS NFR BLD AUTO: 0.1 % (ref 0–1.5)
BH CV ECHO MEAS - ACS: 2 CM
BH CV ECHO MEAS - AO MAX PG: 8.9 MMHG
BH CV ECHO MEAS - AO MEAN PG: 5 MMHG
BH CV ECHO MEAS - AO ROOT AREA (BSA CORRECTED): 1.2
BH CV ECHO MEAS - AO ROOT AREA: 6.6 CM^2
BH CV ECHO MEAS - AO ROOT DIAM: 2.9 CM
BH CV ECHO MEAS - AO V2 MAX: 149 CM/SEC
BH CV ECHO MEAS - AO V2 MEAN: 101 CM/SEC
BH CV ECHO MEAS - AO V2 VTI: 31.8 CM
BH CV ECHO MEAS - BSA(HAYCOCK): 2.6 M^2
BH CV ECHO MEAS - BSA: 2.4 M^2
BH CV ECHO MEAS - BZI_BMI: 40 KILOGRAMS/M^2
BH CV ECHO MEAS - BZI_METRIC_HEIGHT: 177.8 CM
BH CV ECHO MEAS - BZI_METRIC_WEIGHT: 126.6 KG
BH CV ECHO MEAS - EDV(CUBED): 140.6 ML
BH CV ECHO MEAS - EDV(MOD-SP4): 35 ML
BH CV ECHO MEAS - EDV(TEICH): 129.5 ML
BH CV ECHO MEAS - EF(CUBED): 64 %
BH CV ECHO MEAS - EF(MOD-SP4): 57.7 %
BH CV ECHO MEAS - EF(TEICH): 55.1 %
BH CV ECHO MEAS - ESV(CUBED): 50.7 ML
BH CV ECHO MEAS - ESV(MOD-SP4): 14.8 ML
BH CV ECHO MEAS - ESV(TEICH): 58.1 ML
BH CV ECHO MEAS - FS: 28.8 %
BH CV ECHO MEAS - IVS/LVPW: 1.3
BH CV ECHO MEAS - IVSD: 1 CM
BH CV ECHO MEAS - LA DIMENSION: 3 CM
BH CV ECHO MEAS - LA/AO: 1
BH CV ECHO MEAS - LV DIASTOLIC VOL/BSA (35-75): 14.6 ML/M^2
BH CV ECHO MEAS - LV MASS(C)D: 169 GRAMS
BH CV ECHO MEAS - LV MASS(C)DI: 70.3 GRAMS/M^2
BH CV ECHO MEAS - LV SYSTOLIC VOL/BSA (12-30): 6.2 ML/M^2
BH CV ECHO MEAS - LVIDD: 5.2 CM
BH CV ECHO MEAS - LVIDS: 3.7 CM
BH CV ECHO MEAS - LVLD AP4: 6.5 CM
BH CV ECHO MEAS - LVLS AP4: 5.1 CM
BH CV ECHO MEAS - LVOT AREA (M): 2.5 CM^2
BH CV ECHO MEAS - LVOT AREA: 2.5 CM^2
BH CV ECHO MEAS - LVOT DIAM: 1.8 CM
BH CV ECHO MEAS - LVPWD: 0.8 CM
BH CV ECHO MEAS - MV A MAX VEL: 71.7 CM/SEC
BH CV ECHO MEAS - MV E MAX VEL: 111 CM/SEC
BH CV ECHO MEAS - MV E/A: 1.5
BH CV ECHO MEAS - PA ACC TIME: 0.13 SEC
BH CV ECHO MEAS - PA PR(ACCEL): 20.5 MMHG
BH CV ECHO MEAS - SI(AO): 87.4 ML/M^2
BH CV ECHO MEAS - SI(CUBED): 37.4 ML/M^2
BH CV ECHO MEAS - SI(MOD-SP4): 8.4 ML/M^2
BH CV ECHO MEAS - SI(TEICH): 29.7 ML/M^2
BH CV ECHO MEAS - SV(AO): 210 ML
BH CV ECHO MEAS - SV(CUBED): 90 ML
BH CV ECHO MEAS - SV(MOD-SP4): 20.2 ML
BH CV ECHO MEAS - SV(TEICH): 71.4 ML
BUN SERPL-MCNC: 16 MG/DL (ref 6–20)
BUN/CREAT SERPL: 24.2 (ref 7–25)
CALCIUM SPEC-SCNC: 8.9 MG/DL (ref 8.6–10.5)
CHLORIDE SERPL-SCNC: 105 MMOL/L (ref 98–107)
CO2 SERPL-SCNC: 24.7 MMOL/L (ref 22–29)
CREAT SERPL-MCNC: 0.66 MG/DL (ref 0.57–1)
DEPRECATED RDW RBC AUTO: 43.9 FL (ref 37–54)
EOSINOPHIL # BLD AUTO: 0 10*3/MM3 (ref 0–0.4)
EOSINOPHIL NFR BLD AUTO: 0 % (ref 0.3–6.2)
ERYTHROCYTE [DISTWIDTH] IN BLOOD BY AUTOMATED COUNT: 18.6 % (ref 12.3–15.4)
GFR SERPL CREATININE-BSD FRML MDRD: 102 ML/MIN/1.73
GLUCOSE SERPL-MCNC: 129 MG/DL (ref 65–99)
HCT VFR BLD AUTO: 36 % (ref 34–46.6)
HGB BLD-MCNC: 11.1 G/DL (ref 12–15.9)
HYPOCHROMIA BLD QL: NORMAL
IMM GRANULOCYTES # BLD AUTO: 0.09 10*3/MM3 (ref 0–0.05)
IMM GRANULOCYTES NFR BLD AUTO: 0.6 % (ref 0–0.5)
LYMPHOCYTES # BLD AUTO: 1.79 10*3/MM3 (ref 0.7–3.1)
LYMPHOCYTES NFR BLD AUTO: 11 % (ref 19.6–45.3)
MAXIMAL PREDICTED HEART RATE: 185 BPM
MCH RBC QN AUTO: 21.2 PG (ref 26.6–33)
MCHC RBC AUTO-ENTMCNC: 30.8 G/DL (ref 31.5–35.7)
MCV RBC AUTO: 68.7 FL (ref 79–97)
MICROCYTES BLD QL: NORMAL
MONOCYTES # BLD AUTO: 0.5 10*3/MM3 (ref 0.1–0.9)
MONOCYTES NFR BLD AUTO: 3.1 % (ref 5–12)
MRSA DNA SPEC QL NAA+PROBE: NEGATIVE
NEUTROPHILS NFR BLD AUTO: 13.9 10*3/MM3 (ref 1.7–7)
NEUTROPHILS NFR BLD AUTO: 85.2 % (ref 42.7–76)
NRBC BLD AUTO-RTO: 0 /100 WBC (ref 0–0.2)
PLAT MORPH BLD: NORMAL
PLATELET # BLD AUTO: 391 10*3/MM3 (ref 140–450)
PMV BLD AUTO: 10.4 FL (ref 6–12)
POTASSIUM SERPL-SCNC: 4.1 MMOL/L (ref 3.5–5.2)
PROCALCITONIN SERPL-MCNC: 0.04 NG/ML (ref 0–0.25)
RBC # BLD AUTO: 5.24 10*6/MM3 (ref 3.77–5.28)
S AUREUS DNA SPEC QL NAA+PROBE: NEGATIVE
SODIUM SERPL-SCNC: 142 MMOL/L (ref 136–145)
STRESS TARGET HR: 157 BPM
WBC NRBC COR # BLD: 16.3 10*3/MM3 (ref 3.4–10.8)

## 2022-01-16 PROCEDURE — 99233 SBSQ HOSP IP/OBS HIGH 50: CPT | Performed by: STUDENT IN AN ORGANIZED HEALTH CARE EDUCATION/TRAINING PROGRAM

## 2022-01-16 PROCEDURE — 3E0333Z INTRODUCTION OF ANTI-INFLAMMATORY INTO PERIPHERAL VEIN, PERCUTANEOUS APPROACH: ICD-10-PCS | Performed by: HOSPITALIST

## 2022-01-16 PROCEDURE — 87640 STAPH A DNA AMP PROBE: CPT | Performed by: STUDENT IN AN ORGANIZED HEALTH CARE EDUCATION/TRAINING PROGRAM

## 2022-01-16 PROCEDURE — 85007 BL SMEAR W/DIFF WBC COUNT: CPT | Performed by: STUDENT IN AN ORGANIZED HEALTH CARE EDUCATION/TRAINING PROGRAM

## 2022-01-16 PROCEDURE — 25010000002 ENOXAPARIN PER 10 MG: Performed by: STUDENT IN AN ORGANIZED HEALTH CARE EDUCATION/TRAINING PROGRAM

## 2022-01-16 PROCEDURE — 94640 AIRWAY INHALATION TREATMENT: CPT

## 2022-01-16 PROCEDURE — 94799 UNLISTED PULMONARY SVC/PX: CPT

## 2022-01-16 PROCEDURE — 80048 BASIC METABOLIC PNL TOTAL CA: CPT | Performed by: STUDENT IN AN ORGANIZED HEALTH CARE EDUCATION/TRAINING PROGRAM

## 2022-01-16 PROCEDURE — 85025 COMPLETE CBC W/AUTO DIFF WBC: CPT | Performed by: STUDENT IN AN ORGANIZED HEALTH CARE EDUCATION/TRAINING PROGRAM

## 2022-01-16 PROCEDURE — 93306 TTE W/DOPPLER COMPLETE: CPT

## 2022-01-16 PROCEDURE — 87641 MR-STAPH DNA AMP PROBE: CPT | Performed by: STUDENT IN AN ORGANIZED HEALTH CARE EDUCATION/TRAINING PROGRAM

## 2022-01-16 PROCEDURE — 25010000002 METHYLPREDNISOLONE PER 40 MG: Performed by: STUDENT IN AN ORGANIZED HEALTH CARE EDUCATION/TRAINING PROGRAM

## 2022-01-16 RX ORDER — NICOTINE 21 MG/24HR
1 PATCH, TRANSDERMAL 24 HOURS TRANSDERMAL EVERY 24 HOURS
Status: DISCONTINUED | OUTPATIENT
Start: 2022-01-16 | End: 2022-01-19 | Stop reason: HOSPADM

## 2022-01-16 RX ORDER — ALBUTEROL SULFATE 1.25 MG/3ML
1 SOLUTION RESPIRATORY (INHALATION) EVERY 6 HOURS PRN
Status: DISCONTINUED | OUTPATIENT
Start: 2022-01-16 | End: 2022-01-19 | Stop reason: HOSPADM

## 2022-01-16 RX ORDER — PANTOPRAZOLE SODIUM 40 MG/1
40 TABLET, DELAYED RELEASE ORAL
Status: DISCONTINUED | OUTPATIENT
Start: 2022-01-16 | End: 2022-01-19 | Stop reason: HOSPADM

## 2022-01-16 RX ADMIN — SODIUM CHLORIDE, PRESERVATIVE FREE 10 ML: 5 INJECTION INTRAVENOUS at 09:52

## 2022-01-16 RX ADMIN — METHYLPREDNISOLONE SODIUM SUCCINATE 40 MG: 40 INJECTION, POWDER, FOR SOLUTION INTRAMUSCULAR; INTRAVENOUS at 14:11

## 2022-01-16 RX ADMIN — ENOXAPARIN SODIUM 40 MG: 40 INJECTION SUBCUTANEOUS at 19:56

## 2022-01-16 RX ADMIN — GUAIFENESIN 1200 MG: 600 TABLET, EXTENDED RELEASE ORAL at 09:51

## 2022-01-16 RX ADMIN — IPRATROPIUM BROMIDE AND ALBUTEROL SULFATE 3 ML: .5; 3 SOLUTION RESPIRATORY (INHALATION) at 06:42

## 2022-01-16 RX ADMIN — IPRATROPIUM BROMIDE AND ALBUTEROL SULFATE 3 ML: .5; 3 SOLUTION RESPIRATORY (INHALATION) at 18:18

## 2022-01-16 RX ADMIN — METHADONE HYDROCHLORIDE 65 MG: 10 TABLET ORAL at 11:01

## 2022-01-16 RX ADMIN — Medication 1 PATCH: at 11:01

## 2022-01-16 RX ADMIN — GUAIFENESIN 1200 MG: 600 TABLET, EXTENDED RELEASE ORAL at 19:56

## 2022-01-16 RX ADMIN — DOCUSATE SODIUM 50 MG AND SENNOSIDES 8.6 MG 2 TABLET: 8.6; 5 TABLET, FILM COATED ORAL at 09:51

## 2022-01-16 RX ADMIN — METHYLPREDNISOLONE SODIUM SUCCINATE 40 MG: 40 INJECTION, POWDER, FOR SOLUTION INTRAMUSCULAR; INTRAVENOUS at 05:26

## 2022-01-16 RX ADMIN — IPRATROPIUM BROMIDE AND ALBUTEROL SULFATE 3 ML: .5; 3 SOLUTION RESPIRATORY (INHALATION) at 12:53

## 2022-01-16 RX ADMIN — DOXYCYCLINE 100 MG: 100 INJECTION, POWDER, LYOPHILIZED, FOR SOLUTION INTRAVENOUS at 09:51

## 2022-01-16 RX ADMIN — METHYLPREDNISOLONE SODIUM SUCCINATE 40 MG: 40 INJECTION, POWDER, FOR SOLUTION INTRAMUSCULAR; INTRAVENOUS at 19:56

## 2022-01-16 RX ADMIN — DOXYCYCLINE 100 MG: 100 INJECTION, POWDER, LYOPHILIZED, FOR SOLUTION INTRAVENOUS at 19:57

## 2022-01-16 RX ADMIN — PANTOPRAZOLE SODIUM 40 MG: 40 TABLET, DELAYED RELEASE ORAL at 14:11

## 2022-01-16 RX ADMIN — DOCUSATE SODIUM 50 MG AND SENNOSIDES 8.6 MG 2 TABLET: 8.6; 5 TABLET, FILM COATED ORAL at 19:56

## 2022-01-17 LAB
BACTERIA SPEC AEROBE CULT: ABNORMAL
GRAM STN SPEC: ABNORMAL
ISOLATED FROM: ABNORMAL

## 2022-01-17 PROCEDURE — 25010000002 CEFTRIAXONE PER 250 MG: Performed by: STUDENT IN AN ORGANIZED HEALTH CARE EDUCATION/TRAINING PROGRAM

## 2022-01-17 PROCEDURE — 25010000002 METHYLPREDNISOLONE PER 40 MG: Performed by: STUDENT IN AN ORGANIZED HEALTH CARE EDUCATION/TRAINING PROGRAM

## 2022-01-17 PROCEDURE — 94799 UNLISTED PULMONARY SVC/PX: CPT

## 2022-01-17 PROCEDURE — 87040 BLOOD CULTURE FOR BACTERIA: CPT | Performed by: HOSPITALIST

## 2022-01-17 PROCEDURE — 25010000002 ENOXAPARIN PER 10 MG: Performed by: STUDENT IN AN ORGANIZED HEALTH CARE EDUCATION/TRAINING PROGRAM

## 2022-01-17 PROCEDURE — 99232 SBSQ HOSP IP/OBS MODERATE 35: CPT | Performed by: HOSPITALIST

## 2022-01-17 RX ORDER — BUDESONIDE 0.5 MG/2ML
0.5 INHALANT ORAL
Status: DISCONTINUED | OUTPATIENT
Start: 2022-01-17 | End: 2022-01-19 | Stop reason: HOSPADM

## 2022-01-17 RX ADMIN — DOXYCYCLINE 100 MG: 100 INJECTION, POWDER, LYOPHILIZED, FOR SOLUTION INTRAVENOUS at 20:25

## 2022-01-17 RX ADMIN — DOCUSATE SODIUM 50 MG AND SENNOSIDES 8.6 MG 2 TABLET: 8.6; 5 TABLET, FILM COATED ORAL at 20:25

## 2022-01-17 RX ADMIN — METHYLPREDNISOLONE SODIUM SUCCINATE 40 MG: 40 INJECTION, POWDER, FOR SOLUTION INTRAMUSCULAR; INTRAVENOUS at 20:34

## 2022-01-17 RX ADMIN — CEFTRIAXONE 1 G: 1 INJECTION, POWDER, FOR SOLUTION INTRAMUSCULAR; INTRAVENOUS at 08:56

## 2022-01-17 RX ADMIN — DOCUSATE SODIUM 50 MG AND SENNOSIDES 8.6 MG 2 TABLET: 8.6; 5 TABLET, FILM COATED ORAL at 08:56

## 2022-01-17 RX ADMIN — Medication 1 PATCH: at 17:04

## 2022-01-17 RX ADMIN — METHYLPREDNISOLONE SODIUM SUCCINATE 40 MG: 40 INJECTION, POWDER, FOR SOLUTION INTRAMUSCULAR; INTRAVENOUS at 15:31

## 2022-01-17 RX ADMIN — GUAIFENESIN 1200 MG: 600 TABLET, EXTENDED RELEASE ORAL at 10:59

## 2022-01-17 RX ADMIN — DOXYCYCLINE 100 MG: 100 INJECTION, POWDER, LYOPHILIZED, FOR SOLUTION INTRAVENOUS at 08:57

## 2022-01-17 RX ADMIN — ENOXAPARIN SODIUM 40 MG: 40 INJECTION SUBCUTANEOUS at 20:30

## 2022-01-17 RX ADMIN — IPRATROPIUM BROMIDE AND ALBUTEROL SULFATE 3 ML: .5; 3 SOLUTION RESPIRATORY (INHALATION) at 18:38

## 2022-01-17 RX ADMIN — IPRATROPIUM BROMIDE AND ALBUTEROL SULFATE 3 ML: .5; 3 SOLUTION RESPIRATORY (INHALATION) at 00:00

## 2022-01-17 RX ADMIN — SODIUM CHLORIDE, PRESERVATIVE FREE 10 ML: 5 INJECTION INTRAVENOUS at 20:34

## 2022-01-17 RX ADMIN — GUAIFENESIN 1200 MG: 600 TABLET, EXTENDED RELEASE ORAL at 20:25

## 2022-01-17 RX ADMIN — IPRATROPIUM BROMIDE AND ALBUTEROL SULFATE 3 ML: .5; 3 SOLUTION RESPIRATORY (INHALATION) at 13:06

## 2022-01-17 RX ADMIN — BUDESONIDE 0.5 MG: 0.5 INHALANT RESPIRATORY (INHALATION) at 18:38

## 2022-01-17 RX ADMIN — PANTOPRAZOLE SODIUM 40 MG: 40 TABLET, DELAYED RELEASE ORAL at 04:51

## 2022-01-17 RX ADMIN — METHADONE HYDROCHLORIDE 65 MG: 10 TABLET ORAL at 08:56

## 2022-01-17 RX ADMIN — METHYLPREDNISOLONE SODIUM SUCCINATE 40 MG: 40 INJECTION, POWDER, FOR SOLUTION INTRAMUSCULAR; INTRAVENOUS at 04:51

## 2022-01-17 RX ADMIN — IPRATROPIUM BROMIDE AND ALBUTEROL SULFATE 3 ML: .5; 3 SOLUTION RESPIRATORY (INHALATION) at 06:22

## 2022-01-18 LAB
ANION GAP SERPL CALCULATED.3IONS-SCNC: 11.2 MMOL/L (ref 5–15)
ANISOCYTOSIS BLD QL: NORMAL
BASOPHILS # BLD AUTO: 0.02 10*3/MM3 (ref 0–0.2)
BASOPHILS NFR BLD AUTO: 0.1 % (ref 0–1.5)
BUN SERPL-MCNC: 20 MG/DL (ref 6–20)
BUN/CREAT SERPL: 23.5 (ref 7–25)
CALCIUM SPEC-SCNC: 9 MG/DL (ref 8.6–10.5)
CHLORIDE SERPL-SCNC: 102 MMOL/L (ref 98–107)
CO2 SERPL-SCNC: 24.8 MMOL/L (ref 22–29)
CREAT SERPL-MCNC: 0.85 MG/DL (ref 0.57–1)
CRP SERPL-MCNC: 2.22 MG/DL (ref 0–0.5)
CYTOLOGIST CVX/VAG CYTO: NORMAL
DEPRECATED RDW RBC AUTO: 44.7 FL (ref 37–54)
EOSINOPHIL # BLD AUTO: 0.01 10*3/MM3 (ref 0–0.4)
EOSINOPHIL NFR BLD AUTO: 0.1 % (ref 0.3–6.2)
ERYTHROCYTE [DISTWIDTH] IN BLOOD BY AUTOMATED COUNT: 18.5 % (ref 12.3–15.4)
GFR SERPL CREATININE-BSD FRML MDRD: 76 ML/MIN/1.73
GLUCOSE SERPL-MCNC: 118 MG/DL (ref 65–99)
HCT VFR BLD AUTO: 36.7 % (ref 34–46.6)
HGB BLD-MCNC: 10.9 G/DL (ref 12–15.9)
HYPOCHROMIA BLD QL: NORMAL
IMM GRANULOCYTES # BLD AUTO: 0.17 10*3/MM3 (ref 0–0.05)
IMM GRANULOCYTES NFR BLD AUTO: 0.9 % (ref 0–0.5)
LYMPHOCYTES # BLD AUTO: 3.26 10*3/MM3 (ref 0.7–3.1)
LYMPHOCYTES NFR BLD AUTO: 16.8 % (ref 19.6–45.3)
M PNEUMO IGG SER IA-ACNC: 1052 U/ML (ref 0–99)
M PNEUMO IGM SER IA-ACNC: <770 U/ML (ref 0–769)
MCH RBC QN AUTO: 20.6 PG (ref 26.6–33)
MCHC RBC AUTO-ENTMCNC: 29.7 G/DL (ref 31.5–35.7)
MCV RBC AUTO: 69.2 FL (ref 79–97)
MICROCYTES BLD QL: NORMAL
MONOCYTES # BLD AUTO: 0.8 10*3/MM3 (ref 0.1–0.9)
MONOCYTES NFR BLD AUTO: 4.1 % (ref 5–12)
NEUTROPHILS NFR BLD AUTO: 15.17 10*3/MM3 (ref 1.7–7)
NEUTROPHILS NFR BLD AUTO: 78 % (ref 42.7–76)
NRBC BLD AUTO-RTO: 0 /100 WBC (ref 0–0.2)
PATH INTERP BLD-IMP: NORMAL
PLAT MORPH BLD: NORMAL
PLATELET # BLD AUTO: 420 10*3/MM3 (ref 140–450)
PMV BLD AUTO: 10.4 FL (ref 6–12)
POTASSIUM SERPL-SCNC: 4.4 MMOL/L (ref 3.5–5.2)
RBC # BLD AUTO: 5.3 10*6/MM3 (ref 3.77–5.28)
SODIUM SERPL-SCNC: 138 MMOL/L (ref 136–145)
WBC NRBC COR # BLD: 19.43 10*3/MM3 (ref 3.4–10.8)

## 2022-01-18 PROCEDURE — 25010000002 ENOXAPARIN PER 10 MG: Performed by: STUDENT IN AN ORGANIZED HEALTH CARE EDUCATION/TRAINING PROGRAM

## 2022-01-18 PROCEDURE — 25010000002 METHYLPREDNISOLONE PER 40 MG: Performed by: STUDENT IN AN ORGANIZED HEALTH CARE EDUCATION/TRAINING PROGRAM

## 2022-01-18 PROCEDURE — 85007 BL SMEAR W/DIFF WBC COUNT: CPT | Performed by: HOSPITALIST

## 2022-01-18 PROCEDURE — 94799 UNLISTED PULMONARY SVC/PX: CPT

## 2022-01-18 PROCEDURE — 80048 BASIC METABOLIC PNL TOTAL CA: CPT | Performed by: HOSPITALIST

## 2022-01-18 PROCEDURE — 99232 SBSQ HOSP IP/OBS MODERATE 35: CPT | Performed by: HOSPITALIST

## 2022-01-18 PROCEDURE — 86140 C-REACTIVE PROTEIN: CPT | Performed by: HOSPITALIST

## 2022-01-18 PROCEDURE — 85025 COMPLETE CBC W/AUTO DIFF WBC: CPT | Performed by: HOSPITALIST

## 2022-01-18 PROCEDURE — 25010000002 CEFTRIAXONE PER 250 MG: Performed by: STUDENT IN AN ORGANIZED HEALTH CARE EDUCATION/TRAINING PROGRAM

## 2022-01-18 RX ORDER — DOXYCYCLINE 100 MG/1
100 CAPSULE ORAL EVERY 12 HOURS SCHEDULED
Status: DISCONTINUED | OUTPATIENT
Start: 2022-01-18 | End: 2022-01-19 | Stop reason: HOSPADM

## 2022-01-18 RX ORDER — PREDNISONE 20 MG/1
40 TABLET ORAL
Status: DISCONTINUED | OUTPATIENT
Start: 2022-01-19 | End: 2022-01-19 | Stop reason: HOSPADM

## 2022-01-18 RX ORDER — DOXYCYCLINE 100 MG/1
100 CAPSULE ORAL EVERY 12 HOURS SCHEDULED
Status: DISCONTINUED | OUTPATIENT
Start: 2022-01-18 | End: 2022-01-18

## 2022-01-18 RX ADMIN — IPRATROPIUM BROMIDE AND ALBUTEROL SULFATE 3 ML: .5; 3 SOLUTION RESPIRATORY (INHALATION) at 00:00

## 2022-01-18 RX ADMIN — PANTOPRAZOLE SODIUM 40 MG: 40 TABLET, DELAYED RELEASE ORAL at 05:26

## 2022-01-18 RX ADMIN — IPRATROPIUM BROMIDE AND ALBUTEROL SULFATE 3 ML: .5; 3 SOLUTION RESPIRATORY (INHALATION) at 12:12

## 2022-01-18 RX ADMIN — ENOXAPARIN SODIUM 40 MG: 40 INJECTION SUBCUTANEOUS at 21:44

## 2022-01-18 RX ADMIN — BUDESONIDE 0.5 MG: 0.5 INHALANT RESPIRATORY (INHALATION) at 06:33

## 2022-01-18 RX ADMIN — METHYLPREDNISOLONE SODIUM SUCCINATE 40 MG: 40 INJECTION, POWDER, FOR SOLUTION INTRAMUSCULAR; INTRAVENOUS at 05:26

## 2022-01-18 RX ADMIN — SODIUM CHLORIDE, PRESERVATIVE FREE 10 ML: 5 INJECTION INTRAVENOUS at 08:40

## 2022-01-18 RX ADMIN — METHYLPREDNISOLONE SODIUM SUCCINATE 40 MG: 40 INJECTION, POWDER, FOR SOLUTION INTRAMUSCULAR; INTRAVENOUS at 11:57

## 2022-01-18 RX ADMIN — SODIUM CHLORIDE, PRESERVATIVE FREE 10 ML: 5 INJECTION INTRAVENOUS at 21:44

## 2022-01-18 RX ADMIN — BUDESONIDE 0.5 MG: 0.5 INHALANT RESPIRATORY (INHALATION) at 18:32

## 2022-01-18 RX ADMIN — Medication 1 PATCH: at 14:36

## 2022-01-18 RX ADMIN — DOCUSATE SODIUM 50 MG AND SENNOSIDES 8.6 MG 2 TABLET: 8.6; 5 TABLET, FILM COATED ORAL at 21:44

## 2022-01-18 RX ADMIN — DOXYCYCLINE 100 MG: 100 CAPSULE ORAL at 17:00

## 2022-01-18 RX ADMIN — GUAIFENESIN 1200 MG: 600 TABLET, EXTENDED RELEASE ORAL at 08:40

## 2022-01-18 RX ADMIN — CEFTRIAXONE 1 G: 1 INJECTION, POWDER, FOR SOLUTION INTRAMUSCULAR; INTRAVENOUS at 08:39

## 2022-01-18 RX ADMIN — DOXYCYCLINE 100 MG: 100 INJECTION, POWDER, LYOPHILIZED, FOR SOLUTION INTRAVENOUS at 08:39

## 2022-01-18 RX ADMIN — IPRATROPIUM BROMIDE AND ALBUTEROL SULFATE 3 ML: .5; 3 SOLUTION RESPIRATORY (INHALATION) at 06:33

## 2022-01-18 RX ADMIN — DOCUSATE SODIUM 50 MG AND SENNOSIDES 8.6 MG 2 TABLET: 8.6; 5 TABLET, FILM COATED ORAL at 08:40

## 2022-01-18 RX ADMIN — IPRATROPIUM BROMIDE AND ALBUTEROL SULFATE 3 ML: .5; 3 SOLUTION RESPIRATORY (INHALATION) at 18:32

## 2022-01-18 RX ADMIN — GUAIFENESIN 1200 MG: 600 TABLET, EXTENDED RELEASE ORAL at 21:44

## 2022-01-18 RX ADMIN — METHADONE HYDROCHLORIDE 65 MG: 10 TABLET ORAL at 08:40

## 2022-01-19 ENCOUNTER — READMISSION MANAGEMENT (OUTPATIENT)
Dept: CALL CENTER | Facility: HOSPITAL | Age: 37
End: 2022-01-19

## 2022-01-19 VITALS
TEMPERATURE: 98.7 F | SYSTOLIC BLOOD PRESSURE: 133 MMHG | WEIGHT: 278 LBS | DIASTOLIC BLOOD PRESSURE: 74 MMHG | OXYGEN SATURATION: 96 % | HEIGHT: 70 IN | HEART RATE: 80 BPM | RESPIRATION RATE: 20 BRPM | BODY MASS INDEX: 39.8 KG/M2

## 2022-01-19 PROCEDURE — 63710000001 PREDNISONE PER 1 MG: Performed by: HOSPITALIST

## 2022-01-19 PROCEDURE — 94799 UNLISTED PULMONARY SVC/PX: CPT

## 2022-01-19 PROCEDURE — 99239 HOSP IP/OBS DSCHRG MGMT >30: CPT | Performed by: HOSPITALIST

## 2022-01-19 PROCEDURE — 25010000002 CEFTRIAXONE PER 250 MG: Performed by: STUDENT IN AN ORGANIZED HEALTH CARE EDUCATION/TRAINING PROGRAM

## 2022-01-19 RX ORDER — BUDESONIDE AND FORMOTEROL FUMARATE DIHYDRATE 160; 4.5 UG/1; UG/1
2 AEROSOL RESPIRATORY (INHALATION)
Qty: 10.2 G | Refills: 0 | Status: SHIPPED | OUTPATIENT
Start: 2022-01-19

## 2022-01-19 RX ORDER — DOXYCYCLINE 100 MG/1
100 CAPSULE ORAL EVERY 12 HOURS SCHEDULED
Qty: 6 CAPSULE | Refills: 0 | Status: SHIPPED | OUTPATIENT
Start: 2022-01-19 | End: 2022-01-22

## 2022-01-19 RX ORDER — CEFDINIR 300 MG/1
300 CAPSULE ORAL 2 TIMES DAILY
Qty: 6 CAPSULE | Refills: 0 | Status: SHIPPED | OUTPATIENT
Start: 2022-01-19 | End: 2022-01-22

## 2022-01-19 RX ORDER — IPRATROPIUM BROMIDE AND ALBUTEROL SULFATE 2.5; .5 MG/3ML; MG/3ML
3 SOLUTION RESPIRATORY (INHALATION)
Qty: 360 ML | Refills: 3 | Status: SHIPPED | OUTPATIENT
Start: 2022-01-19

## 2022-01-19 RX ORDER — DOXYCYCLINE 100 MG/1
100 CAPSULE ORAL EVERY 12 HOURS SCHEDULED
Qty: 6 CAPSULE | Refills: 0 | Status: SHIPPED | OUTPATIENT
Start: 2022-01-19 | End: 2022-01-19

## 2022-01-19 RX ORDER — PREDNISONE 20 MG/1
40 TABLET ORAL
Qty: 8 TABLET | Refills: 0 | Status: SHIPPED | OUTPATIENT
Start: 2022-01-20 | End: 2022-01-24

## 2022-01-19 RX ORDER — NICOTINE 21 MG/24HR
1 PATCH, TRANSDERMAL 24 HOURS TRANSDERMAL EVERY 24 HOURS
Start: 2022-01-19

## 2022-01-19 RX ORDER — DOXYCYCLINE 100 MG/1
100 CAPSULE ORAL EVERY 12 HOURS SCHEDULED
Qty: 3 CAPSULE | Refills: 0 | Status: SHIPPED | OUTPATIENT
Start: 2022-01-19 | End: 2022-01-19

## 2022-01-19 RX ORDER — BUDESONIDE 0.5 MG/2ML
0.5 INHALANT ORAL
Qty: 60 EACH | Refills: 2 | Status: SHIPPED | OUTPATIENT
Start: 2022-01-19 | End: 2022-01-19

## 2022-01-19 RX ADMIN — DOCUSATE SODIUM 50 MG AND SENNOSIDES 8.6 MG 2 TABLET: 8.6; 5 TABLET, FILM COATED ORAL at 09:01

## 2022-01-19 RX ADMIN — IPRATROPIUM BROMIDE AND ALBUTEROL SULFATE 3 ML: .5; 3 SOLUTION RESPIRATORY (INHALATION) at 00:28

## 2022-01-19 RX ADMIN — BUDESONIDE 0.5 MG: 0.5 INHALANT RESPIRATORY (INHALATION) at 06:31

## 2022-01-19 RX ADMIN — CEFTRIAXONE 1 G: 1 INJECTION, POWDER, FOR SOLUTION INTRAMUSCULAR; INTRAVENOUS at 09:01

## 2022-01-19 RX ADMIN — Medication 1 PATCH: at 11:28

## 2022-01-19 RX ADMIN — PANTOPRAZOLE SODIUM 40 MG: 40 TABLET, DELAYED RELEASE ORAL at 05:49

## 2022-01-19 RX ADMIN — METHADONE HYDROCHLORIDE 65 MG: 10 TABLET ORAL at 09:01

## 2022-01-19 RX ADMIN — IPRATROPIUM BROMIDE AND ALBUTEROL SULFATE 3 ML: .5; 3 SOLUTION RESPIRATORY (INHALATION) at 13:10

## 2022-01-19 RX ADMIN — DOXYCYCLINE 100 MG: 100 CAPSULE ORAL at 09:01

## 2022-01-19 RX ADMIN — GUAIFENESIN 1200 MG: 600 TABLET, EXTENDED RELEASE ORAL at 11:52

## 2022-01-19 RX ADMIN — SODIUM CHLORIDE, PRESERVATIVE FREE 10 ML: 5 INJECTION INTRAVENOUS at 09:01

## 2022-01-19 RX ADMIN — PREDNISONE 40 MG: 20 TABLET ORAL at 09:01

## 2022-01-19 RX ADMIN — IPRATROPIUM BROMIDE AND ALBUTEROL SULFATE 3 ML: .5; 3 SOLUTION RESPIRATORY (INHALATION) at 06:30

## 2022-01-20 LAB — BACTERIA SPEC AEROBE CULT: NORMAL

## 2022-01-20 NOTE — OUTREACH NOTE
Prep Survey      Responses   [facility] discharged from? [facility]   Is LACE score < 7 ? No   Emergency Room discharge w/ pulse ox? No   Eligibility Readm Mgmt   Discharge diagnosis Sepsis   Does the patient have one of the following disease processes/diagnoses(primary or secondary)? Sepsis   Does the patient have Home health ordered? No   Is there a DME ordered? No   Comments regarding appointments see AVS   Medication alerts for this patient see AVS   Prep survey completed? Yes          Shana Aldridge RN

## 2022-01-22 LAB — BACTERIA SPEC AEROBE CULT: NORMAL

## 2022-01-24 ENCOUNTER — READMISSION MANAGEMENT (OUTPATIENT)
Dept: CALL CENTER | Facility: HOSPITAL | Age: 37
End: 2022-01-24

## 2022-01-24 NOTE — OUTREACH NOTE
Sepsis Week 1 Survey      Responses   [facility] patient discharged from? [facility]   Does the patient have one of the following disease processes/diagnoses(primary or secondary)? Sepsis   Week 1 attempt successful? No   Unsuccessful attempts Attempt 1          Arin Garcia RN

## 2022-01-26 ENCOUNTER — READMISSION MANAGEMENT (OUTPATIENT)
Dept: CALL CENTER | Facility: HOSPITAL | Age: 37
End: 2022-01-26

## 2022-01-26 NOTE — OUTREACH NOTE
Sepsis Week 1 Survey      Responses   [facility] patient discharged from? [facility]   Does the patient have one of the following disease processes/diagnoses(primary or secondary)? Sepsis   Week 1 attempt successful? Yes   Call start time 1022   Rescheduled Rescheduled-pt requested  [Patient on way to Dr bhakta ]   Call end time 1023          Shala Pizarro RN

## 2022-01-31 ENCOUNTER — READMISSION MANAGEMENT (OUTPATIENT)
Dept: CALL CENTER | Facility: HOSPITAL | Age: 37
End: 2022-01-31

## 2022-01-31 NOTE — OUTREACH NOTE
Sepsis Week 1 Survey      Responses   [facility] patient discharged from? [facility]   Does the patient have one of the following disease processes/diagnoses(primary or secondary)? Sepsis   Week 1 attempt successful? No   Rescheduled Revoked          Mariela Wong RN

## 2022-11-24 PROCEDURE — 99284 EMERGENCY DEPT VISIT MOD MDM: CPT

## 2022-11-25 ENCOUNTER — HOSPITAL ENCOUNTER (EMERGENCY)
Facility: HOSPITAL | Age: 37
Discharge: HOME | End: 2022-11-25
Attending: STUDENT IN AN ORGANIZED HEALTH CARE EDUCATION/TRAINING PROGRAM | Admitting: STUDENT IN AN ORGANIZED HEALTH CARE EDUCATION/TRAINING PROGRAM

## 2022-11-25 ENCOUNTER — APPOINTMENT (OUTPATIENT)
Dept: GENERAL RADIOLOGY | Facility: HOSPITAL | Age: 37
End: 2022-11-25

## 2022-11-25 VITALS
WEIGHT: 290 LBS | RESPIRATION RATE: 19 BRPM | OXYGEN SATURATION: 91 % | HEIGHT: 70 IN | DIASTOLIC BLOOD PRESSURE: 71 MMHG | HEART RATE: 83 BPM | TEMPERATURE: 99.3 F | SYSTOLIC BLOOD PRESSURE: 125 MMHG | BODY MASS INDEX: 41.52 KG/M2

## 2022-11-25 DIAGNOSIS — J18.9 PNEUMONIA OF BOTH LUNGS DUE TO INFECTIOUS ORGANISM, UNSPECIFIED PART OF LUNG: Primary | ICD-10-CM

## 2022-11-25 LAB
A-A DO2: 73.9 MMHG (ref 0–300)
ALBUMIN SERPL-MCNC: 4.02 G/DL (ref 3.5–5.2)
ALBUMIN/GLOB SERPL: 1.1 G/DL
ALP SERPL-CCNC: 70 U/L (ref 39–117)
ALT SERPL W P-5'-P-CCNC: 16 U/L (ref 1–33)
ANION GAP SERPL CALCULATED.3IONS-SCNC: 15.3 MMOL/L (ref 5–15)
ARTERIAL PATENCY WRIST A: POSITIVE
AST SERPL-CCNC: 23 U/L (ref 1–32)
ATMOSPHERIC PRESS: 729 MMHG
BASE EXCESS BLDA CALC-SCNC: 2.5 MMOL/L (ref 0–2)
BASOPHILS # BLD AUTO: 0.07 10*3/MM3 (ref 0–0.2)
BASOPHILS NFR BLD AUTO: 0.3 % (ref 0–1.5)
BDY SITE: ABNORMAL
BILIRUB SERPL-MCNC: 0.7 MG/DL (ref 0–1.2)
BODY TEMPERATURE: 0 C
BUN SERPL-MCNC: 12 MG/DL (ref 6–20)
BUN/CREAT SERPL: 15.6 (ref 7–25)
CALCIUM SPEC-SCNC: 9.5 MG/DL (ref 8.6–10.5)
CHLORIDE SERPL-SCNC: 102 MMOL/L (ref 98–107)
CO2 BLDA-SCNC: 26.8 MMOL/L (ref 22–33)
CO2 SERPL-SCNC: 23.7 MMOL/L (ref 22–29)
COHGB MFR BLD: 1 % (ref 0–5)
CREAT SERPL-MCNC: 0.77 MG/DL (ref 0.57–1)
CRP SERPL-MCNC: 11.8 MG/DL (ref 0–0.5)
D-LACTATE SERPL-SCNC: 1.1 MMOL/L (ref 0.5–2)
DEPRECATED RDW RBC AUTO: 47.7 FL (ref 37–54)
EGFRCR SERPLBLD CKD-EPI 2021: 102.7 ML/MIN/1.73
EOSINOPHIL # BLD AUTO: 0.35 10*3/MM3 (ref 0–0.4)
EOSINOPHIL NFR BLD AUTO: 1.6 % (ref 0.3–6.2)
ERYTHROCYTE [DISTWIDTH] IN BLOOD BY AUTOMATED COUNT: 18.4 % (ref 12.3–15.4)
FLUAV SUBTYP SPEC NAA+PROBE: NOT DETECTED
FLUBV RNA ISLT QL NAA+PROBE: NOT DETECTED
GAS FLOW AIRWAY: 2 LPM
GLOBULIN UR ELPH-MCNC: 3.6 GM/DL
GLUCOSE SERPL-MCNC: 102 MG/DL (ref 65–99)
HCG SERPL QL: NEGATIVE
HCO3 BLDA-SCNC: 25.7 MMOL/L (ref 20–26)
HCT VFR BLD AUTO: 36.4 % (ref 34–46.6)
HCT VFR BLD CALC: 35.4 % (ref 38–51)
HGB BLD-MCNC: 11.5 G/DL (ref 12–15.9)
HGB BLDA-MCNC: 11.6 G/DL (ref 13.5–17.5)
IMM GRANULOCYTES # BLD AUTO: 0.14 10*3/MM3 (ref 0–0.05)
IMM GRANULOCYTES NFR BLD AUTO: 0.6 % (ref 0–0.5)
INHALED O2 CONCENTRATION: 28 %
LYMPHOCYTES # BLD AUTO: 3.42 10*3/MM3 (ref 0.7–3.1)
LYMPHOCYTES NFR BLD AUTO: 15.7 % (ref 19.6–45.3)
Lab: ABNORMAL
MCH RBC QN AUTO: 23.1 PG (ref 26.6–33)
MCHC RBC AUTO-ENTMCNC: 31.6 G/DL (ref 31.5–35.7)
MCV RBC AUTO: 73.2 FL (ref 79–97)
METHGB BLD QL: 0 % (ref 0–3)
MODALITY: ABNORMAL
MONOCYTES # BLD AUTO: 1.08 10*3/MM3 (ref 0.1–0.9)
MONOCYTES NFR BLD AUTO: 5 % (ref 5–12)
NEUTROPHILS NFR BLD AUTO: 16.67 10*3/MM3 (ref 1.7–7)
NEUTROPHILS NFR BLD AUTO: 76.8 % (ref 42.7–76)
NOTE: ABNORMAL
NRBC BLD AUTO-RTO: 0 /100 WBC (ref 0–0.2)
OXYHGB MFR BLDV: 93.9 % (ref 94–99)
PCO2 BLDA: 34.3 MM HG (ref 35–45)
PCO2 TEMP ADJ BLD: ABNORMAL MM[HG]
PH BLDA: 7.48 PH UNITS (ref 7.35–7.45)
PH, TEMP CORRECTED: ABNORMAL
PLATELET # BLD AUTO: 374 10*3/MM3 (ref 140–450)
PMV BLD AUTO: 9.8 FL (ref 6–12)
PO2 BLDA: 78.9 MM HG (ref 83–108)
PO2 TEMP ADJ BLD: ABNORMAL MM[HG]
POTASSIUM SERPL-SCNC: 4.1 MMOL/L (ref 3.5–5.2)
PROT SERPL-MCNC: 7.6 G/DL (ref 6–8.5)
QT INTERVAL: 336 MS
QTC INTERVAL: 440 MS
RBC # BLD AUTO: 4.97 10*6/MM3 (ref 3.77–5.28)
SAO2 % BLDCOA: 94.8 % (ref 94–99)
SARS-COV-2 RNA PNL SPEC NAA+PROBE: NOT DETECTED
SODIUM SERPL-SCNC: 141 MMOL/L (ref 136–145)
VENTILATOR MODE: ABNORMAL
WBC NRBC COR # BLD: 21.73 10*3/MM3 (ref 3.4–10.8)

## 2022-11-25 PROCEDURE — 93005 ELECTROCARDIOGRAM TRACING: CPT | Performed by: STUDENT IN AN ORGANIZED HEALTH CARE EDUCATION/TRAINING PROGRAM

## 2022-11-25 PROCEDURE — 80053 COMPREHEN METABOLIC PANEL: CPT | Performed by: STUDENT IN AN ORGANIZED HEALTH CARE EDUCATION/TRAINING PROGRAM

## 2022-11-25 PROCEDURE — 96365 THER/PROPH/DIAG IV INF INIT: CPT

## 2022-11-25 PROCEDURE — 96375 TX/PRO/DX INJ NEW DRUG ADDON: CPT

## 2022-11-25 PROCEDURE — 87636 SARSCOV2 & INF A&B AMP PRB: CPT | Performed by: STUDENT IN AN ORGANIZED HEALTH CARE EDUCATION/TRAINING PROGRAM

## 2022-11-25 PROCEDURE — 83605 ASSAY OF LACTIC ACID: CPT | Performed by: STUDENT IN AN ORGANIZED HEALTH CARE EDUCATION/TRAINING PROGRAM

## 2022-11-25 PROCEDURE — 36415 COLL VENOUS BLD VENIPUNCTURE: CPT

## 2022-11-25 PROCEDURE — 87040 BLOOD CULTURE FOR BACTERIA: CPT | Performed by: STUDENT IN AN ORGANIZED HEALTH CARE EDUCATION/TRAINING PROGRAM

## 2022-11-25 PROCEDURE — 36600 WITHDRAWAL OF ARTERIAL BLOOD: CPT

## 2022-11-25 PROCEDURE — 86140 C-REACTIVE PROTEIN: CPT | Performed by: STUDENT IN AN ORGANIZED HEALTH CARE EDUCATION/TRAINING PROGRAM

## 2022-11-25 PROCEDURE — 83050 HGB METHEMOGLOBIN QUAN: CPT

## 2022-11-25 PROCEDURE — 25010000002 LEVOFLOXACIN PER 250 MG: Performed by: STUDENT IN AN ORGANIZED HEALTH CARE EDUCATION/TRAINING PROGRAM

## 2022-11-25 PROCEDURE — C9803 HOPD COVID-19 SPEC COLLECT: HCPCS

## 2022-11-25 PROCEDURE — 94799 UNLISTED PULMONARY SVC/PX: CPT

## 2022-11-25 PROCEDURE — 82805 BLOOD GASES W/O2 SATURATION: CPT

## 2022-11-25 PROCEDURE — 71046 X-RAY EXAM CHEST 2 VIEWS: CPT

## 2022-11-25 PROCEDURE — 85025 COMPLETE CBC W/AUTO DIFF WBC: CPT | Performed by: STUDENT IN AN ORGANIZED HEALTH CARE EDUCATION/TRAINING PROGRAM

## 2022-11-25 PROCEDURE — 25010000002 METHYLPREDNISOLONE PER 125 MG: Performed by: STUDENT IN AN ORGANIZED HEALTH CARE EDUCATION/TRAINING PROGRAM

## 2022-11-25 PROCEDURE — 84703 CHORIONIC GONADOTROPIN ASSAY: CPT | Performed by: STUDENT IN AN ORGANIZED HEALTH CARE EDUCATION/TRAINING PROGRAM

## 2022-11-25 PROCEDURE — 94640 AIRWAY INHALATION TREATMENT: CPT

## 2022-11-25 PROCEDURE — 82375 ASSAY CARBOXYHB QUANT: CPT

## 2022-11-25 RX ORDER — ALBUTEROL SULFATE 2.5 MG/3ML
5 SOLUTION RESPIRATORY (INHALATION)
Status: COMPLETED | OUTPATIENT
Start: 2022-11-25 | End: 2022-11-25

## 2022-11-25 RX ORDER — LEVOFLOXACIN 5 MG/ML
750 INJECTION, SOLUTION INTRAVENOUS ONCE
Status: COMPLETED | OUTPATIENT
Start: 2022-11-25 | End: 2022-11-25

## 2022-11-25 RX ORDER — LEVOFLOXACIN 750 MG/1
750 TABLET ORAL DAILY
Qty: 6 TABLET | Refills: 0 | Status: SHIPPED | OUTPATIENT
Start: 2022-11-25 | End: 2022-12-01

## 2022-11-25 RX ORDER — METHYLPREDNISOLONE SODIUM SUCCINATE 125 MG/2ML
125 INJECTION, POWDER, LYOPHILIZED, FOR SOLUTION INTRAMUSCULAR; INTRAVENOUS ONCE
Status: COMPLETED | OUTPATIENT
Start: 2022-11-25 | End: 2022-11-25

## 2022-11-25 RX ADMIN — LEVOFLOXACIN 750 MG: 750 INJECTION, SOLUTION INTRAVENOUS at 04:18

## 2022-11-25 RX ADMIN — ALBUTEROL SULFATE 2.5 MG: 2.5 SOLUTION RESPIRATORY (INHALATION) at 01:50

## 2022-11-25 RX ADMIN — METHYLPREDNISOLONE SODIUM SUCCINATE 125 MG: 125 INJECTION, POWDER, FOR SOLUTION INTRAMUSCULAR; INTRAVENOUS at 01:45

## 2022-11-25 NOTE — ED PROVIDER NOTES
[facility]  emergency department encounter        Pt Name: Jessica A Collett  MRN: [id number]  Birthdate 1985  Date of evaluation: 2022    Chief Complaint   Patient presents with   • Shortness of Breath             HISTORY OF PRESENT ILLNESS:   Jessica A Collett is a 37 y.o. female PMH asthma, substance use disorder, arthritis.    Patient presents for shortness of breath.  Symptom onset a couple of days ago, constant, progressively worsening.  Reports multiple prior episodes of pneumonia.  Endorses cough, chills without fever.  No significant chest pain.  No abdominal pain dysuria myalgias headache vision changes or rash.    Patient does wear oxygen at home intermittently at 2 L.  Patient has been on prednisone for the past few days.    No other exacerbating or alleviating factors other than as noted above.  Severity: Severe    PCP: Lydia Bhagat APRN          REVIEW OF SYSTEMS:     Review of Systems   Constitutional: Positive for chills. Negative for fever.   HENT: Negative for congestion and rhinorrhea.    Eyes: Negative for visual disturbance.   Respiratory: Positive for cough and shortness of breath.    Cardiovascular: Negative for chest pain.   Gastrointestinal: Negative for abdominal pain and vomiting.   Genitourinary: Negative for dysuria.   Musculoskeletal: Negative for myalgias.   Skin: Negative for rash.   Neurological: Negative for headaches.   Psychiatric/Behavioral: Negative for confusion.       Review of systems otherwise as per HPI.          PREVIOUS HISTORY:         Past Medical History:   Diagnosis Date   • Arthritis    • Asthma    • History of substance abuse (HCC)    • Tobacco use            Past Surgical History:   Procedure Laterality Date   • ABDOMINAL SURGERY     • ANKLE SURGERY     •  SECTION      x3   • CLAVICLE SURGERY      metal plate in place   • TONSILLECTOMY             Social History     Socioeconomic History   • Marital status: Single   Tobacco Use   •  "Smoking status: Every Day     Packs/day: 1.00     Years: 15.00     Pack years: 15.00     Types: Cigarettes   • Smokeless tobacco: Never   Substance and Sexual Activity   • Alcohol use: Never   • Drug use: Not Currently   • Sexual activity: Defer         No family history on file.      Current Outpatient Medications   Medication Instructions   • albuterol sulfate  (90 Base) MCG/ACT inhaler 2 puffs, Inhalation, Every 6 Hours PRN   • budesonide-formoterol (Symbicort) 160-4.5 MCG/ACT inhaler Inhale 2 puffs by mouth 2 (Two) Times a Day.   • ipratropium-albuterol (DUO-NEB) 0.5-2.5 mg/3 ml nebulizer 3 mL, Nebulization, Every 8 Hours - RT   • levoFLOXacin (LEVAQUIN) 750 mg, Oral, Daily   • methadone (DOLOPHINE) 65 mg, Oral, Daily   • nicotine (NICODERM CQ) 21 MG/24HR patch 1 patch, Transdermal, Every 24 Hours         Allergies:  Bactrim [sulfamethoxazole-trimethoprim]    Medications, allergies and past medical, surgical, family, and social history reviewed.        PHYSICAL EXAM:     Patient Vitals for the past 24 hrs:   BP Temp Temp src Pulse Resp SpO2 Height Weight   11/25/22 0309 168/95 -- -- 100 19 94 % -- --   11/25/22 0201 146/82 -- -- 88 -- 93 % -- --   11/25/22 0200 -- -- -- 91 17 93 % -- --   11/25/22 0150 -- -- -- 80 17 95 % -- --   11/25/22 0008 147/79 99.3 °F (37.4 °C) Oral 109 16 90 % 177.8 cm (70\") 132 kg (290 lb)       Physical Exam  Vitals and nursing note reviewed.   Constitutional:       General: She is not in acute distress.     Appearance: She is obese. She is not toxic-appearing.   HENT:      Head: Normocephalic and atraumatic.   Eyes:      Extraocular Movements: Extraocular movements intact.      Conjunctiva/sclera: Conjunctivae normal.   Pulmonary:      Effort: Pulmonary effort is normal. No respiratory distress.      Breath sounds: No stridor. Rhonchi (Inspiratory and expiratory in the left upper lung field) present. No wheezing or rales.   Abdominal:      General: Abdomen is flat. There is no " distension.   Musculoskeletal:         General: No deformity. Normal range of motion.      Cervical back: Normal range of motion. No rigidity.   Skin:     Coloration: Skin is not jaundiced.      Findings: No rash.   Neurological:      General: No focal deficit present.      Mental Status: She is alert and oriented to person, place, and time.   Psychiatric:         Mood and Affect: Mood normal.         Behavior: Behavior normal.             COMPLETED DIAGNOSTIC STUDIES AND INTERVENTIONS:     Lab Results (last 24 hours)     Procedure Component Value Units Date/Time    COVID PRE-OP / PRE-PROCEDURE SCREENING ORDER (NO ISOLATION) - Swab, Nasopharynx [[id number]]  (Normal) Collected: 11/25/22 0133    Specimen: Swab from Nasopharynx Updated: 11/25/22 0157    Narrative:      The following orders were created for panel order COVID PRE-OP / PRE-PROCEDURE SCREENING ORDER (NO ISOLATION) - Swab, Nasopharynx.  Procedure                               Abnormality         Status                     ---------                               -----------         ------                     COVID-19 and FLU A/B PCR...[[id number]]  Normal              Final result                 Please view results for these tests on the individual orders.    COVID-19 and FLU A/B PCR - Swab, Nasopharynx [[id number]]  (Normal) Collected: 11/25/22 0133    Specimen: Swab from Nasopharynx Updated: 11/25/22 0157     COVID19 Not Detected     Influenza A PCR Not Detected     Influenza B PCR Not Detected    Narrative:      Fact sheet for providers: https://www.fda.gov/media/749992/download    Fact sheet for patients: https://www.fda.gov/media/309496/download    Test performed by PCR.    CBC & Differential [[id number]]  (Abnormal) Collected: 11/25/22 0134    Specimen: Blood from Arm, Right Updated: 11/25/22 0140    Narrative:      The following orders were created for panel order CBC & Differential.  Procedure                               Abnormality         Status                      ---------                               -----------         ------                     CBC Auto Differential[[id number]]        Abnormal            Final result               Scan Slide[[id number]]                                                                    Please view results for these tests on the individual orders.    hCG, Serum, Qualitative [[id number]]  (Normal) Collected: 11/25/22 0134    Specimen: Blood from Arm, Right Updated: 11/25/22 0211     HCG Qualitative Negative    C-reactive Protein [[id number]]  (Abnormal) Collected: 11/25/22 0134    Specimen: Blood from Arm, Right Updated: 11/25/22 0214     C-Reactive Protein 11.80 mg/dL     CBC Auto Differential [[id number]]  (Abnormal) Collected: 11/25/22 0134    Specimen: Blood from Arm, Right Updated: 11/25/22 0140     WBC 21.73 10*3/mm3      RBC 4.97 10*6/mm3      Hemoglobin 11.5 g/dL      Hematocrit 36.4 %      MCV 73.2 fL      MCH 23.1 pg      MCHC 31.6 g/dL      RDW 18.4 %      RDW-SD 47.7 fl      MPV 9.8 fL      Platelets 374 10*3/mm3      Neutrophil % 76.8 %      Lymphocyte % 15.7 %      Monocyte % 5.0 %      Eosinophil % 1.6 %      Basophil % 0.3 %      Immature Grans % 0.6 %      Neutrophils, Absolute 16.67 10*3/mm3      Lymphocytes, Absolute 3.42 10*3/mm3      Monocytes, Absolute 1.08 10*3/mm3      Eosinophils, Absolute 0.35 10*3/mm3      Basophils, Absolute 0.07 10*3/mm3      Immature Grans, Absolute 0.14 10*3/mm3      nRBC 0.0 /100 WBC     Blood Gas, Arterial With Co-Ox [[id number]]  (Abnormal) Collected: 11/25/22 0150    Specimen: Arterial Blood Updated: 11/25/22 0156     Site Right Radial     Ryan's Test Positive     pH, Arterial 7.483 pH units      Comment: 83 Value above reference range        pCO2, Arterial 34.3 mm Hg      pO2, Arterial 78.9 mm Hg      Comment: 84 Value below reference range        HCO3, Arterial 25.7 mmol/L      Base Excess, Arterial 2.5 mmol/L      O2 Saturation, Arterial 94.8 %      Hemoglobin, Blood Gas  11.6 g/dL      Comment: 84 Value below reference range        Hematocrit, Blood Gas 35.4 %      Comment: 84 Value below reference range        Oxyhemoglobin 93.9 %      Comment: 84 Value below reference range        Methemoglobin 0.00 %      Comment: 84 Value below reference range        Carboxyhemoglobin 1.0 %      A-a DO2 73.9 mmHg      CO2 Content 26.8 mmol/L      Temperature 0.0 C      Barometric Pressure for Blood Gas 729 mmHg      Modality Nasal Cannula     FIO2 28 %      Flow Rate 2.0 lpm      Ventilator Mode NA     Note --     Collected by [id number]     Comment: Meter: [id number]     :  [id number]        pH, Temp Corrected --     pCO2, Temperature Corrected --     pO2, Temperature Corrected --    Comprehensive Metabolic Panel [[id number]]  (Abnormal) Collected: 11/25/22 0212    Specimen: Blood from Arm, Right Updated: 11/25/22 0229     Glucose 102 mg/dL      BUN 12 mg/dL      Creatinine 0.77 mg/dL      Sodium 141 mmol/L      Potassium 4.1 mmol/L      Chloride 102 mmol/L      CO2 23.7 mmol/L      Calcium 9.5 mg/dL      Total Protein 7.6 g/dL      Albumin 4.02 g/dL      ALT (SGPT) 16 U/L      AST (SGOT) 23 U/L      Alkaline Phosphatase 70 U/L      Total Bilirubin 0.7 mg/dL      Globulin 3.6 gm/dL      A/G Ratio 1.1 g/dL      BUN/Creatinine Ratio 15.6     Anion Gap 15.3 mmol/L      eGFR 102.7 mL/min/1.73      Comment: National Kidney Foundation and American Society of Nephrology (ASN) Task Force recommended calculation based on the Chronic Kidney Disease Epidemiology Collaboration (CKD-EPI) equation refit without adjustment for race.       Narrative:      GFR Normal >60  Chronic Kidney Disease <60  Kidney Failure <15              XR Chest 2 View   Final Result   Patchy bilateral pulmonary opacities scattered throughout both lungs, concerning for multifocal pneumonia in the appropriate clinical setting.      Signer Name: Robinson Medina MD    Signed: 11/25/2022 2:36 AM    Workstation Name: [email or web address]      [facility]            New Medications Ordered This Visit   Medications   • methylPREDNISolone sodium succinate (SOLU-Medrol) injection 125 mg   • albuterol (PROVENTIL) nebulizer solution 0.083% 2.5 mg/3mL   • levoFLOXacin (LEVAQUIN) 750 mg/150 mL D5W (premix) (LEVAQUIN) 750 mg   • levoFLOXacin (LEVAQUIN) 750 MG tablet     Sig: Take 1 tablet by mouth Daily for 6 days.     Dispense:  6 tablet     Refill:  0         Procedures            MEDICAL DECISION-MAKING AND ED COURSE:     ED Course as of 11/25/22 0315   Fri Nov 25, 2022   0036 EKG at 0020 hours sinus tachycardia 103 bpm, , QRS 90, QTc 440, regular axis, no significant ST deviation or T wave abnormalities concerning for acute ischemia.  Delayed R wave progression. [KP]   0239 37-year-old female PMH significant for asthma presents with couple days of progressively worsening shortness of breath.  WBC 21 but patient taking prednisone.  CRP elevated to 11 from 2, 2 years ago.  Left upper lung rhonchi noted on exam. [KP]   0242 XR Chest 2 View  Patchy bilateral pulmonary opacities scattered throughout both lungs, concerning for multifocal pneumonia in the appropriate clinical setting. [KP]   0242 WBC(!): 21.73 [KP]   0242 C-Reactive Protein(!): 11.80 [KP]   0242 COVID19: Not Detected [KP]   0242 Influenza A PCR: Not Detected [KP]   0242 Influenza B PCR: Not Detected [KP]   0311 Patient reports numerous prior episodes of elderly patient antibiotics during prior episodes of pneumonia causing her to return in worse condition  Hospital for multiple days on IV antibiotics.  Patient unsure of which antibiotic she is filled in the past.  Believes Levaquin has worked for her.  I discussed admission with patient, she has declined.  Patient reports that when she is being admitted her oxygen was worse than it is currently.  She has declined admission and would prefer to go home on oral antibiotics.  Will give initial dose of Levaquin IV  followed by 6 days of Levaquin p.o.  She has oxygen at home that she can use as needed.  Patient to return here immediately for any worsening symptoms. [KP]      ED Course User Index  [KP] Amrit Triplett MD       ?      FINAL IMPRESSION:       1. Pneumonia of both lungs due to infectious organism, unspecified part of lung         The complaints listed here are new problems to this examiner.      FOLLOW-UP  Lydia Bhagat, KEON  [address] [facility] [address]  [phone]    Schedule an appointment as soon as possible for a visit       [facility]  [address] [place] [address] [phone]    If symptoms worsen      DISPOSITION  ED Disposition     ED Disposition   Discharge    Condition   Stable    Comment   --                 Please note that portions of this note were completed with a voice recognition program.      Amrit Triplett MD  03:15 EST  11/25/2022             Amrit Triplett MD  11/25/22 0315

## 2022-11-30 LAB
BACTERIA SPEC AEROBE CULT: NORMAL
BACTERIA SPEC AEROBE CULT: NORMAL

## 2023-05-09 ENCOUNTER — OFFICE VISIT (OUTPATIENT)
Dept: CARDIOLOGY | Facility: CLINIC | Age: 38
End: 2023-05-09
Payer: COMMERCIAL

## 2023-05-09 VITALS
BODY MASS INDEX: 39.74 KG/M2 | HEART RATE: 76 BPM | HEIGHT: 70 IN | DIASTOLIC BLOOD PRESSURE: 85 MMHG | SYSTOLIC BLOOD PRESSURE: 135 MMHG | WEIGHT: 277.6 LBS | OXYGEN SATURATION: 96 %

## 2023-05-09 DIAGNOSIS — I50.21 ACUTE SYSTOLIC CONGESTIVE HEART FAILURE: ICD-10-CM

## 2023-05-09 DIAGNOSIS — J18.9 COMMUNITY ACQUIRED PNEUMONIA, UNSPECIFIED LATERALITY: Primary | ICD-10-CM

## 2023-05-09 DIAGNOSIS — U07.1 COVID-19 VIRUS INFECTION: ICD-10-CM

## 2023-05-09 DIAGNOSIS — I20.8 OTHER FORMS OF ANGINA PECTORIS: ICD-10-CM

## 2023-05-09 PROBLEM — I20.89 OTHER FORMS OF ANGINA PECTORIS: Status: ACTIVE | Noted: 2023-05-09

## 2023-05-09 PROCEDURE — 99203 OFFICE O/P NEW LOW 30 MIN: CPT | Performed by: INTERNAL MEDICINE

## 2023-05-09 RX ORDER — POTASSIUM CHLORIDE 750 MG/1
10 CAPSULE, EXTENDED RELEASE ORAL 2 TIMES DAILY
COMMUNITY

## 2023-05-09 RX ORDER — FUROSEMIDE 20 MG/1
20 TABLET ORAL 2 TIMES DAILY
COMMUNITY

## 2023-05-09 NOTE — PROGRESS NOTES
Office Note    Subjective     Jessica A Collett is a 37 y.o. female who presents to day for evaluation of possible heart failure      Active Problems:  Problem List Items Addressed This Visit        Cardiac and Vasculature    Acute systolic congestive heart failure    Relevant Orders    Adult Transthoracic Echo Complete w/ Color, Spectral and Contrast if necessary per protocol    Stress Test With Myocardial Perfusion (1 Day)    Other forms of angina pectoris    Relevant Orders    Stress Test With Myocardial Perfusion (1 Day)       Pulmonary and Pneumonias    CAP (community acquired pneumonia) - Primary       Other    COVID-19 virus infection       HPI  Patient is a pleasant 37-year-old female past medical history significant for COVID disease in 2020.  She has had multiple episodes of hospitalizations since then for pneumonia.  Recently was at [facility] in [facility] for shortness of breath.  Her oxygenation had dropped to 85%.  Had x-rays done also and was told she did not have a pneumonia. she was told she may have heart failure and was given Lasix and potassium.  Patient had BNP levels up to 1200 or so.  Patient is feeling better.  She does have dyspnea on exertion gets short of breath on walking 10 minutes.  Has leg swelling.  Gets short of breath on walking 1 flight of stairs.  Patient smokes half a pack of cigarettes daily.      PRIOR MEDS  Current Outpatient Medications on File Prior to Visit   Medication Sig Dispense Refill   • albuterol sulfate  (90 Base) MCG/ACT inhaler Inhale 2 puffs Every 6 (Six) Hours As Needed for Wheezing.     • budesonide-formoterol (Symbicort) 160-4.5 MCG/ACT inhaler Inhale 2 puffs by mouth 2 (Two) Times a Day. 10.2 g 0   • furosemide (LASIX) 20 MG tablet Take 1 tablet by mouth 2 (Two) Times a Day.     • ipratropium-albuterol (DUO-NEB) 0.5-2.5 mg/3 ml nebulizer Take 3 mL by nebulization Every 8 (Eight) Hours. 360 mL 3   • Liraglutide (SAXENDA) 18 MG/3ML injection pen  "Inject 3 mg under the skin into the appropriate area as directed Daily.     • methadone (DOLOPHINE) 10 MG tablet Take 6.5 tablets by mouth Daily,     • potassium chloride (MICRO-K) 10 MEQ CR capsule Take 1 capsule by mouth 2 (Two) Times a Day.     • nicotine (NICODERM CQ) 21 MG/24HR patch Place 1 patch on the skin as directed by provider Daily. (Patient not taking: Reported on 5/9/2023)       No current facility-administered medications on file prior to visit.       ALLERGIES  Bactrim [sulfamethoxazole-trimethoprim]    HISTORY  Past Medical History:   Diagnosis Date   • Arthritis    • Asthma    • History of substance abuse    • Tobacco use        Social History     Socioeconomic History   • Marital status: Single   Tobacco Use   • Smoking status: Every Day     Packs/day: 1.00     Years: 15.00     Pack years: 15.00     Types: Cigarettes   • Smokeless tobacco: Never   Vaping Use   • Vaping Use: Never used   Substance and Sexual Activity   • Alcohol use: Never   • Drug use: Not Currently   • Sexual activity: Defer       History reviewed. No pertinent family history.    Review of Systems   Respiratory: Positive for chest tightness and shortness of breath.    Cardiovascular: Positive for chest pain, palpitations and leg swelling.       Objective     VITALS: /85 (BP Location: Left arm, Patient Position: Sitting, Cuff Size: Large Adult)   Pulse 76   Ht 177.8 cm (70\")   Wt 126 kg (277 lb 9.6 oz)   SpO2 96%   BMI 39.83 kg/m²     LABS:   No visits with results within 3 Month(s) from this visit.   Latest known visit with results is:   Admission on 11/25/2022, Discharged on 11/25/2022   Component Date Value Ref Range Status   • QT Interval 11/25/2022 336  ms Final   • QTC Interval 11/25/2022 440  ms Final   • Glucose 11/25/2022 102 (H)  65 - 99 mg/dL Final   • BUN 11/25/2022 12  6 - 20 mg/dL Final   • Creatinine 11/25/2022 0.77  0.57 - 1.00 mg/dL Final   • Sodium 11/25/2022 141  136 - 145 mmol/L Final   • Potassium " 11/25/2022 4.1  3.5 - 5.2 mmol/L Final   • Chloride 11/25/2022 102  98 - 107 mmol/L Final   • CO2 11/25/2022 23.7  22.0 - 29.0 mmol/L Final   • Calcium 11/25/2022 9.5  8.6 - 10.5 mg/dL Final   • Total Protein 11/25/2022 7.6  6.0 - 8.5 g/dL Final   • Albumin 11/25/2022 4.02  3.50 - 5.20 g/dL Final   • ALT (SGPT) 11/25/2022 16  1 - 33 U/L Final   • AST (SGOT) 11/25/2022 23  1 - 32 U/L Final   • Alkaline Phosphatase 11/25/2022 70  39 - 117 U/L Final   • Total Bilirubin 11/25/2022 0.7  0.0 - 1.2 mg/dL Final   • Globulin 11/25/2022 3.6  gm/dL Final   • A/G Ratio 11/25/2022 1.1  g/dL Final   • BUN/Creatinine Ratio 11/25/2022 15.6  7.0 - 25.0 Final   • Anion Gap 11/25/2022 15.3 (H)  5.0 - 15.0 mmol/L Final   • eGFR 11/25/2022 102.7  >60.0 mL/min/1.73 Final    National Kidney Foundation and American Society of Nephrology (ASN) Task Force recommended calculation based on the Chronic Kidney Disease Epidemiology Collaboration (CKD-EPI) equation refit without adjustment for race.   • HCG Qualitative 11/25/2022 Negative  Negative Final   • C-Reactive Protein 11/25/2022 11.80 (H)  0.00 - 0.50 mg/dL Final   • COVID19 11/25/2022 Not Detected  Not Detected - Ref. Range Final   • Influenza A PCR 11/25/2022 Not Detected  Not Detected Final   • Influenza B PCR 11/25/2022 Not Detected  Not Detected Final   • WBC 11/25/2022 21.73 (H)  3.40 - 10.80 10*3/mm3 Final   • RBC 11/25/2022 4.97  3.77 - 5.28 10*6/mm3 Final   • Hemoglobin 11/25/2022 11.5 (L)  12.0 - 15.9 g/dL Final   • Hematocrit 11/25/2022 36.4  34.0 - 46.6 % Final   • MCV 11/25/2022 73.2 (L)  79.0 - 97.0 fL Final   • MCH 11/25/2022 23.1 (L)  26.6 - 33.0 pg Final   • MCHC 11/25/2022 31.6  31.5 - 35.7 g/dL Final   • RDW 11/25/2022 18.4 (H)  12.3 - 15.4 % Final   • RDW-SD 11/25/2022 47.7  37.0 - 54.0 fl Final   • MPV 11/25/2022 9.8  6.0 - 12.0 fL Final   • Platelets 11/25/2022 374  140 - 450 10*3/mm3 Final   • Neutrophil % 11/25/2022 76.8 (H)  42.7 - 76.0 % Final   • Lymphocyte %  11/25/2022 15.7 (L)  19.6 - 45.3 % Final   • Monocyte % 11/25/2022 5.0  5.0 - 12.0 % Final   • Eosinophil % 11/25/2022 1.6  0.3 - 6.2 % Final   • Basophil % 11/25/2022 0.3  0.0 - 1.5 % Final   • Immature Grans % 11/25/2022 0.6 (H)  0.0 - 0.5 % Final   • Neutrophils, Absolute 11/25/2022 16.67 (H)  1.70 - 7.00 10*3/mm3 Final   • Lymphocytes, Absolute 11/25/2022 3.42 (H)  0.70 - 3.10 10*3/mm3 Final   • Monocytes, Absolute 11/25/2022 1.08 (H)  0.10 - 0.90 10*3/mm3 Final   • Eosinophils, Absolute 11/25/2022 0.35  0.00 - 0.40 10*3/mm3 Final   • Basophils, Absolute 11/25/2022 0.07  0.00 - 0.20 10*3/mm3 Final   • Immature Grans, Absolute 11/25/2022 0.14 (H)  0.00 - 0.05 10*3/mm3 Final   • nRBC 11/25/2022 0.0  0.0 - 0.2 /100 WBC Final   • Site 11/25/2022 Right Radial   Final   • Ryan's Test 11/25/2022 Positive   Final   • pH, Arterial 11/25/2022 7.483 (H)  7.350 - 7.450 pH units Final    83 Value above reference range   • pCO2, Arterial 11/25/2022 34.3 (L)  35.0 - 45.0 mm Hg Final   • pO2, Arterial 11/25/2022 78.9 (L)  83.0 - 108.0 mm Hg Final    84 Value below reference range   • HCO3, Arterial 11/25/2022 25.7  20.0 - 26.0 mmol/L Final   • Base Excess, Arterial 11/25/2022 2.5 (H)  0.0 - 2.0 mmol/L Final   • O2 Saturation, Arterial 11/25/2022 94.8  94.0 - 99.0 % Final   • Hemoglobin, Blood Gas 11/25/2022 11.6 (L)  13.5 - 17.5 g/dL Final    84 Value below reference range   • Hematocrit, Blood Gas 11/25/2022 35.4 (L)  38.0 - 51.0 % Final    84 Value below reference range   • Oxyhemoglobin 11/25/2022 93.9 (L)  94 - 99 % Final    84 Value below reference range   • Methemoglobin 11/25/2022 0.00  0.00 - 3.00 % Final    84 Value below reference range   • Carboxyhemoglobin 11/25/2022 1.0  0 - 5 % Final   • A-a DO2 11/25/2022 73.9  0.0 - 300.0 mmHg Final   • CO2 Content 11/25/2022 26.8  22 - 33 mmol/L Final   • Temperature 11/25/2022 0.0  C Final   • Barometric Pressure for Blood Gas 11/25/2022 729  mmHg Final   • Modality  11/25/2022 Nasal Cannula   Final   • FIO2 11/25/2022 28  % Final   • Flow Rate 11/25/2022 2.0  lpm Final   • Ventilator Mode 11/25/2022 NA   Final   • Collected by 11/25/2022 [id number]   Final    Meter: [id number]     :  [id number]   • Blood Culture 11/25/2022 No growth at 5 days   Final   • Blood Culture 11/25/2022 No growth at 5 days   Final   • Lactate 11/25/2022 1.1  0.5 - 2.0 mmol/L Final         IMAGING:   No Images in the past 120 days found..  No results found for this or any previous visit.     No results found for this or any previous visit.          EXAM:  Constitutional:       General: Awake.      Appearance: Healthy appearance. Not in distress.   Eyes:      Conjunctiva/sclera: Conjunctivae normal.   HENT:      Head: Normocephalic and atraumatic.      Nose: Nose normal.    Mouth/Throat:      Pharynx: Oropharynx is clear.   Neck:      Thyroid: Thyroid normal.      Vascular: No carotid bruit or JVD.   Pulmonary:      Effort: Pulmonary effort is normal.      Breath sounds: Normal breath sounds.   Chest:      Chest wall: Not tender to palpatation.   Cardiovascular:      PMI at left midclavicular line. Normal rate. Regular rhythm. Normal S1 with normal intensity. Normal S2 with normal intensity.      Murmurs: There is no murmur.      No gallop. No rub.   Pulses:     Intact distal pulses.   Edema:     Peripheral edema absent.   Abdominal:      General: Bowel sounds are normal. There is no distension.      Palpations: Abdomen is soft. There is no hepatomegaly.      Tenderness: There is no abdominal tenderness.   Musculoskeletal: Normal range of motion.      Cervical back: Normal range of motion. Skin:     General: Skin is warm and dry. There is no cyanosis.      Coloration: Skin is not jaundiced.   Neurological:      Mental Status: Alert and oriented to person, place and time.      Motor: Motor function is intact.      Gait: Gait is intact.   Psychiatric:         Attention and Perception: Attention and  perception normal.         Speech: Speech normal.         Behavior: Behavior is cooperative.         Cognition and Memory: Cognition and memory normal.         Judgment: Judgment normal.          Procedure   Procedures       Assessment & Plan     Diagnoses and all orders for this visit:    1. Community acquired pneumonia, unspecified laterality (Primary)    2. COVID-19 virus infection    3. Acute systolic congestive heart failure  -     Adult Transthoracic Echo Complete w/ Color, Spectral and Contrast if necessary per protocol  -     Stress Test With Myocardial Perfusion (1 Day)    4. Other forms of angina pectoris  -     Stress Test With Myocardial Perfusion (1 Day)          PLAN  #1 cardiac.  Patient is here for evaluation of possible heart failure.  Patient had recent ER visit with worsening shortness of breath and her BNP was up.  She was admitted at outside hospital for 5 days and was treated with diuretics.  Her BNP was elevated at that time.  Patient had normal EF on echo in January 16, 2022.  We will get a repeat echo and stress done.  May need heart failure medications if she her EF is low.  We will watch her closely and clinically.    Cigarette cessation advised.           MEDS ORDERED DURING VISIT:    There are no discontinued medications.     No orders of the defined types were placed in this encounter.        Follow Up:   Return in about 6 weeks (around 6/20/2023).    Patient was given instructions and counseling regarding her condition or for health maintenance advice. Please see specific information pulled into the AVS if appropriate.   As always, Lydia Bhagat APRN  I appreciate very much the opportunity to participate in the cardiovascular care of your patients. Please do not hesitate to call me with any questions with regards to Jessica A Collett evaluation and management.         This document has been electronically signed by Polina Camejo MD [facility], Interventional Cardiology  May 9, 2023 16:19  EDT    Dictated Utilizing Dragon Dictation: Part of this note may be an electronic transcription/translation of spoken language to printed text using the Dragon Dictation System.

## 2023-07-10 PROBLEM — F17.210 CIGARETTE NICOTINE DEPENDENCE WITHOUT COMPLICATION: Status: ACTIVE | Noted: 2023-07-10

## 2023-09-07 ENCOUNTER — OFFICE VISIT (OUTPATIENT)
Dept: PULMONOLOGY | Facility: CLINIC | Age: 38
End: 2023-09-07
Payer: COMMERCIAL

## 2023-09-07 VITALS
HEIGHT: 70 IN | SYSTOLIC BLOOD PRESSURE: 140 MMHG | BODY MASS INDEX: 38.22 KG/M2 | RESPIRATION RATE: 18 BRPM | TEMPERATURE: 98 F | HEART RATE: 90 BPM | OXYGEN SATURATION: 98 % | DIASTOLIC BLOOD PRESSURE: 110 MMHG | WEIGHT: 267 LBS

## 2023-09-07 DIAGNOSIS — E66.09 CLASS 2 OBESITY DUE TO EXCESS CALORIES WITHOUT SERIOUS COMORBIDITY WITH BODY MASS INDEX (BMI) OF 38.0 TO 38.9 IN ADULT: ICD-10-CM

## 2023-09-07 DIAGNOSIS — R06.02 SHORTNESS OF BREATH: ICD-10-CM

## 2023-09-07 DIAGNOSIS — J45.40 MODERATE PERSISTENT ASTHMA WITHOUT COMPLICATION: Primary | ICD-10-CM

## 2023-09-07 DIAGNOSIS — F17.210 CIGARETTE NICOTINE DEPENDENCE WITHOUT COMPLICATION: ICD-10-CM

## 2023-09-07 LAB
FEV1/FVC: 83 %
FEV1: 3.21 LITERS
FVC VOL RESPIRATORY: 3.86 LITERS
PEF: 5.4 L/S

## 2023-09-07 ASSESSMENT — PULMONARY FUNCTION TESTS
FVC: 3.86
FEV1: 3.21
FEV1/FVC: 83

## 2023-09-07 NOTE — PROGRESS NOTES
"Chief Complaint  Shortness of Breath    Subjective        Jessica A Collett presents to [facility]  History of Present Illness    Ms. Collett is a 37 year old female with a medical history significant for arthritis, and asthma.    She presents today for follow up on shortness of breath.  She states that she has been doing much better since she started using the inhaler she was prescribed at her last visit.  She tells me that she does still cough up mucous in the mornings. She is currently taking dulera BID and albuterol as needed.  She is also smoking about 1 ppd.        Objective   Vital Signs:  BP (!) 140/110   Pulse 90   Temp 98 °F (36.7 °C) (Temporal)   Resp 18   Ht 177.8 cm (70\")   Wt 121 kg (267 lb)   SpO2 98%   BMI 38.31 kg/m²   Estimated body mass index is 38.31 kg/m² as calculated from the following:    Height as of this encounter: 177.8 cm (70\").    Weight as of this encounter: 121 kg (267 lb).               Physical Exam     GENERAL APPEARANCE: Well developed, well nourished, alert and cooperative, and appears to be in no acute distress.    HEAD: normocephalic. Atraumatic.    EYES: PERRL, EOMI. Vision is grossly intact.    THROAT: Oral cavity and pharynx normal. No inflammation, swelling, exudate, or lesions.     NECK: Neck supple.  No thyromegaly.    CARDIAC: Normal S1 and S2. No S3, S4 or murmurs. Rhythm is regular.     RESPIRATORY:Bilateral air entry positive. Bilateral diminished breath sounds. No wheezing, crackles or rhonchi noted.    GI: Positive bowel sounds. Soft, nondistended, nontender.     MUSCULOSKELETAL: No significant deformity or joint abnormality. No edema. Peripheral pulses intact. No varicosities.    NEUROLOGICAL: Strength and sensation symmetric and intact throughout.     PSYCHIATRIC: The mental examination revealed the patient was oriented to person, place, and time.     Result Review :  The following data was reviewed by: " Jazmine Johnson, APRN on 09/07/2023:  Common labs          11/25/2022    01:34 11/25/2022    02:12 7/17/2023    10:33   Common Labs   Glucose  102  84    BUN  12  11    Creatinine  0.77  0.75    Sodium  141  137    Potassium  4.1  4.5    Chloride  102  101    Calcium  9.5  9.4    Albumin  4.02  4.0    Total Bilirubin  0.7  <0.2    Alkaline Phosphatase  70  69    AST (SGOT)  23  16    ALT (SGPT)  16  16    WBC 21.73   10.22    Hemoglobin 11.5   10.3    Hematocrit 36.4   35.1    Platelets 374   474                   Assessment and Plan   Diagnoses and all orders for this visit:    1. Moderate persistent asthma without complication (Primary)  -     Only Spirometry; Future  -     Only Spirometry    2. Shortness of breath    3. Class 2 obesity due to excess calories without serious comorbidity with body mass index (BMI) of 38.0 to 38.9 in adult    4. Cigarette nicotine dependence without complication            Continue dulera BID.  Will continue albuterol as needed.  Continue Zyrtec.  Will start her on Spiriva once daily. Inhaler eduction was provided.       - Inhaler technique demonstration/discussion:  I have extensively discussed the steps.  In summary, the steps were discussed in the following order.Patient was advised to rinse the mouth after steroid inhalation to prevent fungal mucositis.  Remove the cap from the inhaler and shake well.    Breathe out all the way.    Place the mouthpiece of the inhaler between your teeth and seal your lips tightly around it.    As you start to breathe in slowly, press down on the canister one time.   Keep breathing in as slowly and deeply as you can.    It should take about 5 seconds for you to completely breathe in.    Hold your breath for 10 seconds(count to 10 slowly) to allow the medication to reach the airways of the lung.    Repeat the above steps for each puff.    Wait about 1 minute between the puffs.    Replaced the cap on the inhaler when finished.;         Alpha-  antitrypsin swab was normal.  Spirometry was completed in the office and reviewed with the patient.    Jessica A Collett  reports that she has been smoking cigarettes. She started smoking about 18 years ago. She has a 7.50 pack-year smoking history. She has never used smokeless tobacco.. I have educated her on the risk of diseases from using tobacco products such as cancer, COPD, and heart disease.     I advised her to quit and she is not interested in quitting at this time..         Follow Up   Return in about 3 months (around 12/7/2023).  Patient was given instructions and counseling regarding her condition or for health maintenance advice. Please see specific information pulled into the AVS if appropriate.

## 2024-03-06 RX ORDER — TIOTROPIUM BROMIDE INHALATION SPRAY 3.12 UG/1
2 SPRAY, METERED RESPIRATORY (INHALATION)
Qty: 4 G | Refills: 5 | Status: SHIPPED | OUTPATIENT
Start: 2024-03-06

## 2024-04-02 ENCOUNTER — TELEPHONE (OUTPATIENT)
Dept: PULMONOLOGY | Facility: CLINIC | Age: 39
End: 2024-04-02
Payer: COMMERCIAL

## 2024-04-02 NOTE — TELEPHONE ENCOUNTER
"\"Called patient after speaking with Jazmine to let her know she will need to discontinue use of her Spiriva inhaler and check back with us in a week to see if symptoms subside. \"                 "

## 2024-04-24 ENCOUNTER — OFFICE VISIT (OUTPATIENT)
Dept: CARDIOLOGY | Facility: CLINIC | Age: 39
End: 2024-04-24
Payer: COMMERCIAL

## 2024-04-24 VITALS
OXYGEN SATURATION: 97 % | DIASTOLIC BLOOD PRESSURE: 83 MMHG | WEIGHT: 293 LBS | HEART RATE: 77 BPM | HEIGHT: 70 IN | BODY MASS INDEX: 41.95 KG/M2 | SYSTOLIC BLOOD PRESSURE: 139 MMHG

## 2024-04-24 DIAGNOSIS — R00.2 PALPITATIONS: Primary | ICD-10-CM

## 2024-04-24 DIAGNOSIS — F17.210 CIGARETTE NICOTINE DEPENDENCE WITHOUT COMPLICATION: Chronic | ICD-10-CM

## 2024-04-24 DIAGNOSIS — I10 ESSENTIAL HYPERTENSION: Chronic | ICD-10-CM

## 2024-04-24 PROCEDURE — 99214 OFFICE O/P EST MOD 30 MIN: CPT | Performed by: NURSE PRACTITIONER

## 2024-04-24 PROCEDURE — 1159F MED LIST DOCD IN RCRD: CPT | Performed by: NURSE PRACTITIONER

## 2024-04-24 PROCEDURE — 93000 ELECTROCARDIOGRAM COMPLETE: CPT | Performed by: NURSE PRACTITIONER

## 2024-04-24 PROCEDURE — 3075F SYST BP GE 130 - 139MM HG: CPT | Performed by: NURSE PRACTITIONER

## 2024-04-24 PROCEDURE — 1160F RVW MEDS BY RX/DR IN RCRD: CPT | Performed by: NURSE PRACTITIONER

## 2024-04-24 PROCEDURE — 3079F DIAST BP 80-89 MM HG: CPT | Performed by: NURSE PRACTITIONER

## 2024-04-24 RX ORDER — LOSARTAN POTASSIUM 50 MG/1
50 TABLET ORAL 2 TIMES DAILY
COMMUNITY
Start: 2024-03-20

## 2024-04-24 RX ORDER — NICOTINE 21 MG/24HR
1 PATCH, TRANSDERMAL 24 HOURS TRANSDERMAL EVERY 24 HOURS
Qty: 28 PATCH | Refills: 2 | Status: SHIPPED | OUTPATIENT
Start: 2024-04-24

## 2024-04-24 NOTE — PROGRESS NOTES
"Chief Complaint  Palpitations (Rapid heart rate )    Subjective          Jessica A Collett presents to [facility]ARD[facility]OLOG[facility] for follow up.    History of Present Illness    Dana was last seen in clinic on 5/9/2023 by Dr. Camejo.  A stress test and echocardiogram were ordered.  However, patient never had these test completed.    She reports that recently she felt like she was going to pass out.  Her heart was racing at rest.  She has can often feel her heart racing.  She had an episode of heart racing and associated numbness on the right side of her neck.         Objective     Vital Signs:   /83 (BP Location: Left arm, Patient Position: Sitting, Cuff Size: Large Adult)   Pulse 77   Ht 177.8 cm (70\")   Wt 133 kg (294 lb)   SpO2 97%   BMI 42.18 kg/m²       Physical Exam  Vitals reviewed.   Constitutional:       Appearance: Normal appearance. She is well-developed.   Cardiovascular:      Rate and Rhythm: Normal rate and regular rhythm.      Heart sounds: No murmur heard.     No friction rub. No gallop.   Pulmonary:      Effort: Pulmonary effort is normal. No respiratory distress.      Breath sounds: Wheezing present. No rales.   Skin:     General: Skin is warm and dry.   Neurological:      Mental Status: She is alert and oriented to person, place, and time.   Psychiatric:         Mood and Affect: Mood normal.         Behavior: Behavior normal.          Result Review :            ECG 12 Lead    Date/Time: 4/24/2024 2:14 PM  Performed by: Olamide Islas APRN    Authorized by: Olamide Islas APRN  Comparison: compared with previous ECG from 11/25/2022  Similar to previous ECG  Rhythm: sinus rhythm  Rate: normal  BPM: 83  Other findings: low voltage and T wave abnormality  Comments: QTc 399           Most recent echocardiogram  Results for orders placed during the hospital encounter of 01/15/22    Adult Transthoracic Echo Complete w/ Color, Spectral and Contrast if necessary per " protocol    Interpretation Summary  · Left ventricular ejection fraction appears to be 61 - 65%. Left ventricular systolic function is normal.  · Left ventricular diastolic function was normal.  · The cardiac chamber dimensions are normal.  · All valves appeared normal in structure and showed no stenosis or significant regurgitation.  · There is no evidence of pericardial effusion.      Most recent Stress Test       Most recent Cardiac Cath      Current Outpatient Medications   Medication Sig Dispense Refill    albuterol sulfate  (90 Base) MCG/ACT inhaler Inhale 2 puffs Every 6 (Six) Hours As Needed for Wheezing.      cetirizine (zyrTEC) 10 MG tablet Take 1 tablet by mouth Daily. 30 tablet 5    furosemide (LASIX) 20 MG tablet Take 1 tablet by mouth 2 (Two) Times a Day.      ipratropium-albuterol (DUO-NEB) 0.5-2.5 mg/3 ml nebulizer Take 3 mL by nebulization Every 8 (Eight) Hours. 360 mL 3    losartan (COZAAR) 50 MG tablet Take 1 tablet by mouth 2 (Two) Times a Day.      methadone (DOLOPHINE) 10 MG tablet Take 6.5 tablets by mouth Daily,      mometasone-formoterol (Dulera) 200-5 MCG/ACT inhaler Inhale 2 puffs 2 (Two) Times a Day. 13 g 11    potassium chloride (MICRO-K) 10 MEQ CR capsule Take 1 capsule by mouth 2 (Two) Times a Day.      Protonix 40 MG EC tablet Take 1 tablet by mouth Daily.      Vistaril 25 MG capsule Take 1 capsule by mouth Every 8 (Eight) Hours.      Liraglutide (SAXENDA) 18 MG/3ML injection pen Inject 3 mg under the skin into the appropriate area as directed Daily.      metoprolol tartrate (LOPRESSOR) 25 MG tablet Take 0.5 tablets by mouth 2 (Two) Times a Day. 60 tablet 11    nicotine (Nicoderm CQ) 21 MG/24HR patch Place 1 patch on the skin as directed by provider Daily. 28 patch 2    tiotropium bromide monohydrate (Spiriva Respimat) 2.5 MCG/ACT aerosol solution inhaler Inhale 2 puffs Daily. (Patient not taking: Reported on 4/24/2024) 4 g 5     No current facility-administered medications for  this visit.            Assessment and Plan    Problem List Items Addressed This Visit          Cardiac and Vasculature    Essential hypertension (Chronic)    Relevant Medications    losartan (COZAAR) 50 MG tablet    metoprolol tartrate (LOPRESSOR) 25 MG tablet    Other Relevant Orders    ECG 12 Lead       Tobacco    Cigarette nicotine dependence without complication (Chronic)    Relevant Medications    nicotine (Nicoderm CQ) 21 MG/24HR patch     Other Visit Diagnoses       Palpitations    -  Primary    Relevant Orders    Adult Transthoracic Echo Complete W/ Cont if Necessary Per Protocol    Cardiac Event Monitor    ECG 12 Lead                Follow Up     Medications were reviewed with the patient.    Hypertension is not well-controlled.  Metoprolol added today.    The patient's report of palpitations I have ordered an echocardiogram and a monitor.    Risk factor modification: Smoking cessation counseling was given.  Patient advised regarding the correlation between cigarette smoking and coronary artery disease, as well as lung disease, cancer.  Patient was offered strategies to quit, including medication.  The patient is  interested in quitting.    Return in about 2 months (around 6/24/2024).    Patient was given instructions and counseling regarding her condition or for health maintenance advice. Please see specific information pulled into the AVS if appropriate.

## 2024-06-06 RX ORDER — CETIRIZINE HYDROCHLORIDE 10 MG/1
10 TABLET ORAL DAILY
Qty: 30 TABLET | Refills: 5 | Status: SHIPPED | OUTPATIENT
Start: 2024-06-06

## 2024-10-28 ENCOUNTER — OFFICE VISIT (OUTPATIENT)
Dept: CARDIOLOGY | Facility: CLINIC | Age: 39
End: 2024-10-28
Payer: COMMERCIAL

## 2024-10-28 VITALS
OXYGEN SATURATION: 98 % | BODY MASS INDEX: 41.95 KG/M2 | HEART RATE: 80 BPM | HEIGHT: 70 IN | WEIGHT: 293 LBS | DIASTOLIC BLOOD PRESSURE: 80 MMHG | SYSTOLIC BLOOD PRESSURE: 120 MMHG

## 2024-10-28 DIAGNOSIS — I10 ESSENTIAL HYPERTENSION: Chronic | ICD-10-CM

## 2024-10-28 DIAGNOSIS — Z01.810 PRE-OPERATIVE CARDIOVASCULAR EXAMINATION: ICD-10-CM

## 2024-10-28 DIAGNOSIS — R60.1 GENERALIZED EDEMA: Primary | ICD-10-CM

## 2024-10-28 PROBLEM — Z72.0 TOBACCO USE: Status: ACTIVE | Noted: 2024-10-28

## 2024-10-28 RX ORDER — ASPIRIN 81 MG/1
1 TABLET ORAL DAILY
COMMUNITY

## 2024-10-28 RX ORDER — DEXLANSOPRAZOLE 30 MG/1
30 CAPSULE, DELAYED RELEASE ORAL DAILY
COMMUNITY

## 2024-10-28 RX ORDER — MONTELUKAST SODIUM 10 MG/1
1 TABLET ORAL DAILY
COMMUNITY
Start: 2024-05-02

## 2024-10-28 RX ORDER — LORAZEPAM 0.5 MG/1
0.5 TABLET ORAL AS NEEDED
COMMUNITY
Start: 2024-05-02

## 2024-10-28 NOTE — PROGRESS NOTES
Chief Complaint  Follow-up (Denies CP, Complains of SOA on exertion, some dizziness when standing quickly,mild leg/ankle swelling. Left leg has stabbing pain around certain spot.) and Med Management (Tolerating all current medications with no side effects.)    Subjective          Jessica A Collett presents to [facility] for follow up.    History of Present Illness  History of Present Illness  The patient is a 39-year-old female presenting for a follow-up on essential hypertension.    She was last seen in the clinic on 04/24/2024, during which an echocardiogram and an event monitor were ordered. However, she has not yet undergone the echocardiogram.      She reports no recent chest pain.  She reports occasional palpitations.  She states that her endocrinologist believes this may be related to her thyroid as well.     She is scheduled for thyroid surgery due to the presence of three nodules, which were found to be inconclusive upon biopsy. The surgery will be performed at [facility] in [place]. She has an appointment with Dr. Sheffield on the 8th to schedule her thyroid surgery.    She also reports leg swelling, which she noticed yesterday after doing laundry. She suspects she may have hit her leg on the laundry basket, but does not believe it was a hard impact. She recently went on a 2-hour drive and noticed that her legs swell if she remains seated for extended periods. She has been trying to stay active but does not engage in regular exercise. She was previously diagnosed with heart failure at the emergency room in [place].    She has been experiencing severe panic attacks and anxiety over the past year, which she believes may be related to her thyroid condition. She has been trying to quit smoking but has been unsuccessful so far. She is not currently using the nicotine patch.    SOCIAL HISTORY  The patient is still smoking.       Objective     Vital Signs:   /80 (BP Location:  "Left arm, Patient Position: Sitting, Cuff Size: Large Adult)   Pulse 80   Ht 177.8 cm (70\")   Wt (!) 138 kg (304 lb 9.6 oz)   SpO2 98%   BMI 43.71 kg/m²       Physical Exam  Vitals reviewed.   Constitutional:       Appearance: Normal appearance. She is well-developed.   Cardiovascular:      Rate and Rhythm: Normal rate and regular rhythm.      Pulses:           Dorsalis pedis pulses are 2+ on the right side and 2+ on the left side.        Posterior tibial pulses are 2+ on the right side and 2+ on the left side.      Heart sounds: No murmur heard.     No friction rub. No gallop.   Pulmonary:      Effort: Pulmonary effort is normal. No respiratory distress.      Breath sounds: Normal breath sounds. No wheezing or rales.   Musculoskeletal:      Right lower le+ Edema present.      Left lower le+ Edema present.   Skin:     General: Skin is warm and dry.   Neurological:      Mental Status: She is alert and oriented to person, place, and time.   Psychiatric:         Mood and Affect: Mood normal.         Behavior: Behavior normal.          Result Review :                ECG 12 Lead    Date/Time: 10/28/2024 5:18 PM  Performed by: Olamide Islas APRN    Authorized by: Olamide Islas APRN  Comparison: compared with previous ECG from 2022  Similar to previous ECG  Rhythm: sinus rhythm  Rate: normal  BPM: 78    Clinical impression: normal ECG  Comments: QTc 397           Most recent echocardiogram  Results for orders placed during the hospital encounter of 01/15/22    Adult Transthoracic Echo Complete w/ Color, Spectral and Contrast if necessary per protocol    Interpretation Summary  · Left ventricular ejection fraction appears to be 61 - 65%. Left ventricular systolic function is normal.  · Left ventricular diastolic function was normal.  · The cardiac chamber dimensions are normal.  · All valves appeared normal in structure and showed no stenosis or significant regurgitation.  · There is no evidence " of pericardial effusion.        Current Outpatient Medications   Medication Sig Dispense Refill    albuterol sulfate  (90 Base) MCG/ACT inhaler Inhale 2 puffs Every 6 (Six) Hours As Needed for Wheezing.      aspirin 81 MG EC tablet Take 1 tablet by mouth Daily.      cetirizine (zyrTEC) 10 MG tablet Take 1 tablet by mouth Daily. 30 tablet 5    dexlansoprazole (DEXILANT) 30 MG capsule Take 1 capsule by mouth Daily.      furosemide (LASIX) 20 MG tablet Take 1 tablet by mouth Daily.      ipratropium-albuterol (DUO-NEB) 0.5-2.5 mg/3 ml nebulizer Take 3 mL by nebulization Every 8 (Eight) Hours. 360 mL 3    LORazepam (ATIVAN) 0.5 MG tablet Take 1 tablet by mouth As Needed.      losartan (COZAAR) 50 MG tablet Take 1 tablet by mouth 2 (Two) Times a Day.      methadone (DOLOPHINE) 10 MG tablet Take 6.5 tablets by mouth Daily,      metoprolol tartrate (LOPRESSOR) 25 MG tablet Take 0.5 tablets by mouth 2 (Two) Times a Day. 60 tablet 11    mometasone-formoterol (Dulera) 200-5 MCG/ACT inhaler Inhale 2 puffs 2 (Two) Times a Day. 13 g 11    montelukast (SINGULAIR) 10 MG tablet Take 1 tablet by mouth Daily.      potassium chloride ER (K-TAB) 20 MEQ tablet controlled-release ER tablet Take 1 tablet by mouth Daily.      sertraline (ZOLOFT) 50 MG tablet Take 1 tablet by mouth Daily.      tiotropium bromide monohydrate (Spiriva Respimat) 2.5 MCG/ACT aerosol solution inhaler Inhale 2 puffs Daily. 4 g 5    Vistaril 25 MG capsule Take 1 capsule by mouth Every 8 (Eight) Hours.       No current facility-administered medications for this visit.            Assessment and Plan    Problem List Items Addressed This Visit          Cardiac and Vasculature    Essential hypertension (Chronic)    Relevant Orders    ECG 12 Lead     Other Visit Diagnoses       Generalized edema    -  Primary    Relevant Orders    Adult Transthoracic Echo Complete W/ Cont if Necessary Per Protocol    Pre-operative cardiovascular examination        Relevant Orders  "   ECG 12 Lead            Assessment & Plan  1. Essential hypertension.  Her event monitor results were satisfactory, showing a heart rate of  with less than 1% PAC and PVC burden, and no heart blocks or arrhythmias. Today's EKG showed normal sinus rhythm. The swelling observed is likely due to dependent edema rather than heart failure. An echocardiogram has been ordered to confirm the absence of heart failure. She has been advised to discontinue aspirin due to the increased risk of GI bleed and to cease smoking.    2. Dependent edema.  She reports swelling in her legs, which worsens during the day and improves at night. She is currently taking Lasix, which helps manage the swelling. There is no temperature difference, redness, or lumps in the affected area, and pulses are good. The swelling is likely due to dependent edema rather than heart failure.    Non-cardiac  3. Thyroid nodules.  She has thyroid nodules with inconclusive biopsy results. Thyroid surgery is planned, and she has an appointment on November 8, 2024, to schedule the surgery. Symptoms such as panic attacks and anxiety may be related to thyroid issues.  Perioperative risk assessment can be completed once patient has had echocardiogram given that she has been told in the past that she had \"heart failure\".             Follow Up     Medications were reviewed with the patient.    Return in about 6 months (around 4/28/2025).    Patient was given instructions and counseling regarding her condition or for health maintenance advice. Please see specific information pulled into the AVS if appropriate.     Patient or patient representative verbalized consent for the use of Ambient Listening during the visit with  KEON Gandara for chart documentation. 10/28/2024  17:17 EDT   "

## 2024-11-13 ENCOUNTER — HOSPITAL ENCOUNTER (OUTPATIENT)
Facility: HOSPITAL | Age: 39
Discharge: HOME | End: 2024-11-13
Admitting: NURSE PRACTITIONER
Payer: COMMERCIAL

## 2024-11-13 DIAGNOSIS — R60.1 GENERALIZED EDEMA: ICD-10-CM

## 2024-11-13 PROCEDURE — 93306 TTE W/DOPPLER COMPLETE: CPT

## 2024-11-18 LAB
BH CV ECHO MEAS - AO MAX PG: 8.9 MMHG
BH CV ECHO MEAS - AO MEAN PG: 4.6 MMHG
BH CV ECHO MEAS - AO ROOT DIAM: 2.24 CM
BH CV ECHO MEAS - AO V2 MAX: 149 CM/SEC
BH CV ECHO MEAS - AO V2 VTI: 26.8 CM
BH CV ECHO MEAS - EDV(MOD-SP4): 70.1 ML
BH CV ECHO MEAS - EF(MOD-BP): 63 %
BH CV ECHO MEAS - EF(MOD-SP4): 63.1 %
BH CV ECHO MEAS - ESV(MOD-SP4): 25.9 ML
BH CV ECHO MEAS - IVS/LVPW: 0.67 CM
BH CV ECHO MEAS - IVSD: 0.93 CM
BH CV ECHO MEAS - LA DIMENSION: 4.8 CM
BH CV ECHO MEAS - LAT PEAK E' VEL: 16.6 CM/SEC
BH CV ECHO MEAS - LV DIASTOLIC VOL/BSA (35-75): 28.1 CM2
BH CV ECHO MEAS - LV MAX PG: 4.4 MMHG
BH CV ECHO MEAS - LV MEAN PG: 2.6 MMHG
BH CV ECHO MEAS - LV SYSTOLIC VOL/BSA (12-30): 10.4 CM2
BH CV ECHO MEAS - LV V1 MAX: 105 CM/SEC
BH CV ECHO MEAS - LV V1 VTI: 21.1 CM
BH CV ECHO MEAS - LVIDD: 4.7 CM
BH CV ECHO MEAS - LVIDS: 3.1 CM
BH CV ECHO MEAS - LVOT DIAM: 1.79 CM
BH CV ECHO MEAS - LVPWD: 1.38 CM
BH CV ECHO MEAS - MED PEAK E' VEL: 16.2 CM/SEC
BH CV ECHO MEAS - PA ACC TIME: 0.19 SEC
BH CV ECHO MEAS - RAP SYSTOLE: 10 MMHG
BH CV ECHO MEAS - RVSP: 13.1 MMHG
BH CV ECHO MEAS - SV(MOD-SP4): 44.2 ML
BH CV ECHO MEAS - SVI(MOD-SP4): 17.7 ML/M2
BH CV ECHO MEAS - TAPSE (>1.6): 2.7 CM
BH CV ECHO MEAS - TR MAX PG: 3.1 MMHG
BH CV ECHO MEAS - TR MAX VEL: 88 CM/SEC

## 2024-11-19 ENCOUNTER — TELEPHONE (OUTPATIENT)
Dept: CARDIOLOGY | Facility: CLINIC | Age: 39
End: 2024-11-19
Payer: COMMERCIAL

## 2024-11-19 NOTE — TELEPHONE ENCOUNTER
Contacted patient's number and spoke to her mother , Amita, as patient was having trouble breathing and had gone to urgent care. Relayed information of normal test results. Patient is to contact us if she has any further question

## 2024-11-19 NOTE — TELEPHONE ENCOUNTER
----- Message from Olamide Islas sent at 11/18/2024  4:35 PM EST -----  Please call patient about their normal result.    Echocardiogram was fine.  Your heart pumping function is normal.  There is some left ventricular wall thickness which is usually attributed to undertreated or untreated hypertension, however when you were last seen your blood pressure was fine.  No changes to your medications.    I have completed a perioperative risk assessment for your thyroid surgery with Dr. Sheffield and the form has been faxed to his office.    Thanks, Olamide

## 2024-12-12 ENCOUNTER — TELEPHONE (OUTPATIENT)
Dept: CARDIOLOGY | Facility: CLINIC | Age: 39
End: 2024-12-12

## 2024-12-12 NOTE — TELEPHONE ENCOUNTER
REQUEST FOR CARDIAC CLEARANCE    Caller name: [facility]    Phone Number: [phone]    Surgeon's name: DR. NICOLA LUCIA    Type of planned surgery: THROIDECTOMY    Date of planned surgery: 12.19.2024    Type of anesthesia: GENERAL    Have you been experiencing chest pain or shortness of breath? IN NOVEMBER SOB    Is your doctor requesting for you to stop any of your medications prior to your surgery? NONE    Where should we fax the clearance to? [phone] NO FORM TO FAX